# Patient Record
Sex: MALE | Race: BLACK OR AFRICAN AMERICAN | URBAN - METROPOLITAN AREA
[De-identification: names, ages, dates, MRNs, and addresses within clinical notes are randomized per-mention and may not be internally consistent; named-entity substitution may affect disease eponyms.]

---

## 2017-04-06 ENCOUNTER — TELEPHONE (OUTPATIENT)
Dept: FAMILY MEDICINE CLINIC | Age: 27
End: 2017-04-06

## 2017-04-06 NOTE — TELEPHONE ENCOUNTER
Pt has a cpe scheduled 4/17 and needs labs ordered before his appt. PLease review and order accordingly.

## 2017-04-10 ENCOUNTER — HOSPITAL ENCOUNTER (OUTPATIENT)
Dept: LAB | Age: 27
Discharge: HOME | End: 2017-04-10

## 2017-04-10 DIAGNOSIS — Z13.29 SCREENING FOR THYROID DISORDER: ICD-10-CM

## 2017-04-10 DIAGNOSIS — Z13.228 SCREENING FOR METABOLIC DISORDER: ICD-10-CM

## 2017-04-10 DIAGNOSIS — Z13.89 SCREENING FOR BLOOD OR PROTEIN IN URINE: ICD-10-CM

## 2017-04-10 DIAGNOSIS — Z13.0 SCREENING, ANEMIA, DEFICIENCY, IRON: ICD-10-CM

## 2017-04-10 DIAGNOSIS — Z13.1 SCREENING FOR DIABETES MELLITUS: ICD-10-CM

## 2017-04-10 DIAGNOSIS — Z13.220 SCREENING, LIPID: ICD-10-CM

## 2017-04-10 DIAGNOSIS — Z13.21 ENCOUNTER FOR VITAMIN DEFICIENCY SCREENING: ICD-10-CM

## 2017-04-10 DIAGNOSIS — Z13.0 SCREENING, ANEMIA, DEFICIENCY, IRON: Primary | ICD-10-CM

## 2017-04-10 PROCEDURE — 99001 SPECIMEN HANDLING PT-LAB: CPT | Performed by: FAMILY MEDICINE

## 2017-04-11 LAB
25(OH)D3+25(OH)D2 SERPL-MCNC: 15.3 NG/ML (ref 30–100)
ALBUMIN SERPL-MCNC: 4.3 G/DL (ref 3.5–5.5)
ALBUMIN/GLOB SERPL: 1.4 {RATIO} (ref 1.2–2.2)
ALP SERPL-CCNC: 108 IU/L (ref 39–117)
ALT SERPL-CCNC: 39 IU/L (ref 0–44)
APPEARANCE UR: CLEAR
AST SERPL-CCNC: 27 IU/L (ref 0–40)
BASOPHILS # BLD AUTO: 0 X10E3/UL (ref 0–0.2)
BASOPHILS NFR BLD AUTO: 0 %
BILIRUB SERPL-MCNC: <0.2 MG/DL (ref 0–1.2)
BILIRUB UR QL STRIP: NEGATIVE
BUN SERPL-MCNC: 16 MG/DL (ref 6–20)
BUN/CREAT SERPL: 17 (ref 9–20)
CALCIUM SERPL-MCNC: 9.2 MG/DL (ref 8.7–10.2)
CHLORIDE SERPL-SCNC: 103 MMOL/L (ref 96–106)
CHOLEST SERPL-MCNC: 170 MG/DL (ref 100–199)
CO2 SERPL-SCNC: 22 MMOL/L (ref 18–29)
COLOR UR: YELLOW
CREAT SERPL-MCNC: 0.92 MG/DL (ref 0.76–1.27)
EOSINOPHIL # BLD AUTO: 0.3 X10E3/UL (ref 0–0.4)
EOSINOPHIL NFR BLD AUTO: 5 %
ERYTHROCYTE [DISTWIDTH] IN BLOOD BY AUTOMATED COUNT: 13.9 % (ref 12.3–15.4)
EST. AVERAGE GLUCOSE BLD GHB EST-MCNC: 117 MG/DL
GLOBULIN SER CALC-MCNC: 3 G/DL (ref 1.5–4.5)
GLUCOSE SERPL-MCNC: 88 MG/DL (ref 65–99)
GLUCOSE UR QL: NEGATIVE
HBA1C MFR BLD: 5.7 % (ref 4.8–5.6)
HCT VFR BLD AUTO: 44.5 % (ref 37.5–51)
HDLC SERPL-MCNC: 33 MG/DL
HGB BLD-MCNC: 14.6 G/DL (ref 12.6–17.7)
HGB UR QL STRIP: NEGATIVE
IMM GRANULOCYTES # BLD: 0 X10E3/UL (ref 0–0.1)
IMM GRANULOCYTES NFR BLD: 0 %
INTERPRETATION, 910389: NORMAL
KETONES UR QL STRIP: NEGATIVE
LDLC SERPL CALC-MCNC: 88 MG/DL (ref 0–99)
LEUKOCYTE ESTERASE UR QL STRIP: NEGATIVE
LYMPHOCYTES # BLD AUTO: 2.9 X10E3/UL (ref 0.7–3.1)
LYMPHOCYTES NFR BLD AUTO: 39 %
MCH RBC QN AUTO: 27 PG (ref 26.6–33)
MCHC RBC AUTO-ENTMCNC: 32.8 G/DL (ref 31.5–35.7)
MCV RBC AUTO: 82 FL (ref 79–97)
MICRO URNS: NORMAL
MONOCYTES # BLD AUTO: 0.8 X10E3/UL (ref 0.1–0.9)
MONOCYTES NFR BLD AUTO: 10 %
NEUTROPHILS # BLD AUTO: 3.3 X10E3/UL (ref 1.4–7)
NEUTROPHILS NFR BLD AUTO: 46 %
NITRITE UR QL STRIP: NEGATIVE
PH UR STRIP: 6 [PH] (ref 5–7.5)
PLATELET # BLD AUTO: 219 X10E3/UL (ref 150–379)
POTASSIUM SERPL-SCNC: 4.6 MMOL/L (ref 3.5–5.2)
PROT SERPL-MCNC: 7.3 G/DL (ref 6–8.5)
PROT UR QL STRIP: NEGATIVE
RBC # BLD AUTO: 5.41 X10E6/UL (ref 4.14–5.8)
SODIUM SERPL-SCNC: 144 MMOL/L (ref 134–144)
SP GR UR: 1.03 (ref 1–1.03)
TRIGL SERPL-MCNC: 244 MG/DL (ref 0–149)
TSH SERPL DL<=0.005 MIU/L-ACNC: 1.66 UIU/ML (ref 0.45–4.5)
UROBILINOGEN UR STRIP-MCNC: 0.2 MG/DL (ref 0.2–1)
VLDLC SERPL CALC-MCNC: 49 MG/DL (ref 5–40)
WBC # BLD AUTO: 7.4 X10E3/UL (ref 3.4–10.8)

## 2017-04-17 ENCOUNTER — OFFICE VISIT (OUTPATIENT)
Dept: FAMILY MEDICINE CLINIC | Age: 27
End: 2017-04-17

## 2017-04-17 VITALS
WEIGHT: 236 LBS | SYSTOLIC BLOOD PRESSURE: 132 MMHG | DIASTOLIC BLOOD PRESSURE: 84 MMHG | RESPIRATION RATE: 16 BRPM | TEMPERATURE: 98.7 F | HEART RATE: 78 BPM | BODY MASS INDEX: 31.28 KG/M2 | OXYGEN SATURATION: 98 % | HEIGHT: 73 IN

## 2017-04-17 DIAGNOSIS — E66.9 OBESITY (BMI 30.0-34.9): ICD-10-CM

## 2017-04-17 DIAGNOSIS — E55.9 VITAMIN D DEFICIENCY: ICD-10-CM

## 2017-04-17 DIAGNOSIS — E78.1 HYPERTRIGLYCERIDEMIA: ICD-10-CM

## 2017-04-17 DIAGNOSIS — Z00.00 ROUTINE GENERAL MEDICAL EXAMINATION AT A HEALTH CARE FACILITY: Primary | ICD-10-CM

## 2017-04-17 DIAGNOSIS — R73.03 PREDIABETES: ICD-10-CM

## 2017-04-17 RX ORDER — ERGOCALCIFEROL 1.25 MG/1
50000 CAPSULE ORAL
Qty: 12 CAP | Refills: 3 | Status: SHIPPED | OUTPATIENT
Start: 2017-04-17 | End: 2018-04-13

## 2017-04-17 NOTE — PROGRESS NOTES
Jack Trevino is a 23 y.o. male here for physical       1. Have you been to the ER, urgent care clinic or hospitalized since your last visit? NO.     2. Have you seen or consulted any other health care providers outside of the [address] [place] [address] since your last visit (Include any pap smears or colon screening)? NO      Do you have an Advanced Directive? NO    Would you like information on Advanced Directives?  NO

## 2017-04-17 NOTE — PATIENT INSTRUCTIONS
Avoid dietary starch and sugar and follow a program of regular aerobic exercise. Begin ergocalciferol (Vitamin D2) 50,000 units weekly and OTC Vitamin D3 2,000 units daily. Return for follow up in 6 months, sooner with any problems. HgbA1c, Vitamin D level, fasting lipid panel at follow up       Well Visit, Ages 25 to 48: Care Instructions  Your Care Instructions  Physical exams can help you stay healthy. Your doctor has checked your overall health and may have suggested ways to take good care of yourself. He or she also may have recommended tests. At home, you can help prevent illness with healthy eating, regular exercise, and other steps. Follow-up care is a key part of your treatment and safety. Be sure to make and go to all appointments, and call your doctor if you are having problems. It's also a good idea to know your test results and keep a list of the medicines you take. How can you care for yourself at home? · Reach and stay at a healthy weight. This will lower your risk for many problems, such as obesity, diabetes, heart disease, and high blood pressure. · Get at least 30 minutes of physical activity on most days of the week. Walking is a good choice. You also may want to do other activities, such as running, swimming, cycling, or playing tennis or team sports. Discuss any changes in your exercise program with your doctor. · Do not smoke or allow others to smoke around you. If you need help quitting, talk to your doctor about stop-smoking programs and medicines. These can increase your chances of quitting for good. · Talk to your doctor about whether you have any risk factors for sexually transmitted infections (STIs). Having one sex partner (who does not have STIs and does not have sex with anyone else) is a good way to avoid these infections. · Use birth control if you do not want to have children at this time.  Talk with your doctor about the choices available and what might be best for you.  · Protect your skin from too much sun. When you're outdoors from 10 a.m. to 4 p.m., stay in the shade or cover up with clothing and a hat with a wide brim. Wear sunglasses that block UV rays. Even when it's cloudy, put broad-spectrum sunscreen (SPF 30 or higher) on any exposed skin. · See a dentist one or two times a year for checkups and to have your teeth cleaned. · Wear a seat belt in the car. · Drink alcohol in moderation, if at all. That means no more than 2 drinks a day for men and 1 drink a day for women. Follow your doctor's advice about when to have certain tests. These tests can spot problems early. For everyone  · Cholesterol. Have the fat (cholesterol) in your blood tested after age 21. Your doctor will tell you how often to have this done based on your age, family history, or other things that can increase your risk for heart disease. · Blood pressure. Have your blood pressure checked during a routine doctor visit. Your doctor will tell you how often to check your blood pressure based on your age, your blood pressure results, and other factors. · Vision. Talk with your doctor about how often to have a glaucoma test.  · Diabetes. Ask your doctor whether you should have tests for diabetes. · Colon cancer. Have a test for colon cancer at age 48. You may have one of several tests. If you are younger than 48, you may need a test earlier if you have any risk factors. Risk factors include whether you already had a precancerous polyp removed from your colon or whether your parent, brother, sister, or child has had colon cancer. For women  · Breast exam and mammogram. Talk to your doctor about when you should have a clinical breast exam and a mammogram. Medical experts differ on whether and how often women under 50 should have these tests. Your doctor can help you decide what is right for you. · Pap test and pelvic exam. Begin Pap tests at age 24.  A Pap test is the best way to find cervical cancer. The test often is part of a pelvic exam. Ask how often to have this test.  · Tests for sexually transmitted infections (STIs). Ask whether you should have tests for STIs. You may be at risk if you have sex with more than one person, especially if your partners do not wear condoms. For men  · Tests for sexually transmitted infections (STIs). Ask whether you should have tests for STIs. You may be at risk if you have sex with more than one person, especially if you do not wear a condom. · Testicular cancer exam. Ask your doctor whether you should check your testicles regularly. · Prostate exam. Talk to your doctor about whether you should have a blood test (called a PSA test) for prostate cancer. Experts differ on whether and when men should have this test. Some experts suggest it if you are older than 39 and are -American or have a father or brother who got prostate cancer when he was younger than 72. When should you call for help? Watch closely for changes in your health, and be sure to contact your doctor if you have any problems or symptoms that concern you. Where can you learn more? Go to http://raj-yan.info/. Enter P072 in the search box to learn more about \"Well Visit, Ages 25 to 48: Care Instructions. \"  Current as of: July 19, 2016  Content Version: 11.2  © 1732-4872 [employer]. Care instructions adapted under license by [employer] (which disclaims liability or warranty for this information). If you have questions about a medical condition or this instruction, always ask your healthcare professional. [place] [address] any warranty or liability for your use of this information. Prediabetes: Care Instructions  Your Care Instructions  Prediabetes is a warning sign that you are at risk for getting type 2 diabetes. It means that your blood sugar is higher than it should be.  The food you eat turns into sugar, which your body uses for energy. Normally, an organ called the pancreas makes insulin, which allows the sugar in your blood to get into your body's cells. But when your body can't use insulin the right way, the sugar doesn't move into cells. It stays in your blood instead. This is called insulin resistance. The buildup of sugar in the blood causes prediabetes. The good news is that lifestyle changes may help you get your blood sugar back to normal and help you avoid or delay diabetes. Follow-up care is a key part of your treatment and safety. Be sure to make and go to all appointments, and call your doctor if you are having problems. It's also a good idea to know your test results and keep a list of the medicines you take. How can you care for yourself at home? · Watch your weight. A healthy weight helps your body use insulin properly. · Limit the amount of calories, sweets, and unhealthy fat you eat. Ask your doctor if you should see a dietitian. A registered dietitian can help you create meal plans that fit your lifestyle. · Get at least 30 minutes of exercise on most days of the week. Exercise helps control your blood sugar. It also helps you maintain a healthy weight. Walking is a good choice. You also may want to do other activities, such as running, swimming, cycling, or playing tennis or team sports. · Do not smoke. Smoking can make prediabetes worse. If you need help quitting, talk to your doctor about stop-smoking programs and medicines. These can increase your chances of quitting for good. · If your doctor prescribed medicines, take them exactly as prescribed. Call your doctor if you think you are having a problem with your medicine. You will get more details on the specific medicines your doctor prescribes. When should you call for help? Watch closely for changes in your health, and be sure to contact your doctor if:  · You have any symptoms of diabetes.  These may include:  ¨ Being thirsty more often.  Dellia Dec more. ¨ Being hungrier. ¨ Losing weight. ¨ Being very tired. ¨ Having blurry vision. · You have a wound that will not heal.  · You have an infection that will not go away. · You have problems with your blood pressure. · You want more information about diabetes and how you can keep from getting it. Where can you learn more? Go to http://raj-yan.info/. Enter I222 in the search box to learn more about \"Prediabetes: Care Instructions. \"  Current as of: May 23, 2016  Content Version: 11.2  © 8479-4783 [employer]. Care instructions adapted under license by [employer] (which disclaims liability or warranty for this information). If you have questions about a medical condition or this instruction, always ask your healthcare professional. [address] any warranty or liability for your use of this information. Learning About Obesity  What is obesity? Obesity means having so much body fat that your health is in danger. Having too much body fat can lead to type 2 diabetes, heart disease, high blood pressure, arthritis, sleep apnea, and stroke. Even if you don't feel bad now, think about these health risks. Do they seem like a good reason to start on a new path toward a healthier weight? Or do you have another personal, powerful reason for wanting to lose weight? Whatever it is, keep it in mind. It can be hard to change eating habits and exercise habits. But with your own reason and plan, you can do it. How do you know if your weight is in the obesity range? To know if your weight is in the obesity range, your doctor looks at your body mass index (BMI) and waist size. Your BMI is a number that is calculated from your weight and your height.  To figure your BMI for yourself, get a BMI table from your doctor or use an online tool, such as http://www.abdul.com/ on the [place] [employer]. What causes obesity? When you take in more calories than you burn off, you gain weight. How you eat, how active you are, and other things affect how your body uses calories and whether you gain weight. If you have family members who have too much body fat, you may have inherited a tendency to gain weight. And your family also helps form your eating and lifestyle habits, which can lead to obesity. Also, our busy lives make it harder to plan and cook healthy meals. For many of us, it's easier to reach for prepared foods, go out to eat, or go to the drive-through. But these foods are often high in saturated fat and calories. Portions are often too large. What can you do to reach a healthy weight? Focus on health, not diets. Diets are hard to stay on and don't work in the long run. It is very hard to stay with a diet that includes lots of big changes in your eating habits. Instead of a diet, focus on lifestyle changes that will improve your health and achieve the right balance of energy and calories. To lose weight, you need to burn more calories than you take in. You can do it by eating healthy foods in reasonable amounts and becoming more active, even a little bit every day. Making small changes over time can add up to a lot. Make a plan for change. Many people have found that naming their reasons for change and staying focused on their plan can make a big difference. Work with your doctor to create a plan that is right for you. · Ask yourself: [place] are my personal, most powerful reasons for wanting this change? What will my life look like when I've made the change? \"  · Set your long-term goal. Make it specific, such as \"I will lose x pounds. \"  · Break your long-term goal into smaller, short-term goals. Make these small steps specific and within your reach, things you know you can do. These steps are what keep you going from day to day.   How can you stay on your plan for change? Be ready. Choose to start during a time when there are few events that might trigger slip-ups, like holidays, social events, and high-stress periods. Decide on your first few steps. Most people have more success when they make small changes, one step at a time. For example, you might switch a daily candy bar to a piece of fruit, walk 10 minutes more, or add more vegetables to a meal.  Line up your support people. Make sure you're not going to be alone as you make this change. Connect with people who understand how important it is to you. Ask family members and friends for help in keeping with your plan. And think about who could make it harder for you, and how to handle them. Try tracking. People who keep track of what they eat, feel, and do are better at losing weight. Try writing down things like:  · What and how much you eat. · How you feel before and after each meal.  · Details about each meal (like eating out or at home, eating alone, or with friends or family). · What you do to be active. Look and plan. As you track, look for patterns that you may want to change. Take note of:  · When you eat and whether you skip meals. · How often you eat out. · How many fruits and vegetables you eat. · When you eat beyond feeling full. · When and why you eat for reasons other than being hungry. When you stray from your plan, don't get upset. Figure out what made you slip up and how you can fix it. Can you take medicines or have surgery to lose weight? Before your doctor will prescribe medicines or surgery, he or she will probably want you to be more active and follow your healthy eating plan for a period of time. These habits are key lifelong changes for managing your weight, with or without other medical treatment. And these changes can help you avoid weight-related health problems. Follow-up care is a key part of your treatment and safety.  Be sure to make and go to all appointments, and call your doctor if you are having problems. It's also a good idea to know your test results and keep a list of the medicines you take. Where can you learn more? Go to http://raj-yan.info/. Enter N111 in the search box to learn more about \"Learning About Obesity. \"  Current as of: October 13, 2016  Content Version: 11.2  © 4058-3788 [employer]. Care instructions adapted under license by [employer] (which disclaims liability or warranty for this information). If you have questions about a medical condition or this instruction, always ask your healthcare professional. [address] any warranty or liability for your use of this information. Starting a Weight Loss Plan: Care Instructions  Your Care Instructions  If you are thinking about losing weight, it can be hard to know where to start. Your doctor can help you set up a weight loss plan that best meets your needs. You may want to take a class on nutrition or exercise, or join a weight loss support group. If you have questions about how to make changes to your eating or exercise habits, ask your doctor about seeing a registered dietitian or an exercise specialist.  It can be a big challenge to lose weight. But you do not have to make huge changes at once. Make small changes, and stick with them. When those changes become habit, add a few more changes. If you do not think you are ready to make changes right now, try to pick a date in the future. Make an appointment to see your doctor to discuss whether the time is right for you to start a plan. Follow-up care is a key part of your treatment and safety. Be sure to make and go to all appointments, and call your doctor if you are having problems. Its also a good idea to know your test results and keep a list of the medicines you take. How can you care for yourself at home? · Set realistic goals.  Many people expect to lose much more weight than is likely. A weight loss of 5% to 10% of your body weight may be enough to improve your health. · Get family and friends involved to provide support. Talk to them about why you are trying to lose weight, and ask them to help. They can help by participating in exercise and having meals with you, even if they may be eating something different. · Find what works best for you. If you do not have time or do not like to cook, a program that offers meal replacement bars or shakes may be better for you. Or if you like to prepare meals, finding a plan that includes daily menus and recipes may be best.  · Ask your doctor about other health professionals who can help you achieve your weight loss goals. ¨ A dietitian can help you make healthy changes in your diet. ¨ An exercise specialist or  can help you develop a safe and effective exercise program.  ¨ A counselor or psychiatrist can help you cope with issues such as depression, anxiety, or family problems that can make it hard to focus on weight loss. · Consider joining a support group for people who are trying to lose weight. Your doctor can suggest groups in your area. Where can you learn more? Go to http://raj-yan.info/. Enter R943 in the search box to learn more about \"Starting a Weight Loss Plan: Care Instructions. \"  Current as of: October 13, 2016  Content Version: 11.2  © 6986-7519 [employer]. Care instructions adapted under license by [employer] (which disclaims liability or warranty for this information). If you have questions about a medical condition or this instruction, always ask your healthcare professional. [place] [address] any warranty or liability for your use of this information.

## 2017-04-17 NOTE — MR AVS SNAPSHOT
Visit Information Date & Time Provider Department Dept. Phone Encounter #  
 4/17/2017  1:00 [phone] [id number] Upcoming Health Maintenance Date Due INFLUENZA AGE 9 TO ADULT 8/1/2016 DTaP/Tdap/Td series (7 - Td) 7/24/2019 Allergies as of 4/17/2017  Review Complete On: 4/17/2017 By: Tank Beltran MD  
 No Known Allergies Current Immunizations  Never Reviewed Name Date DTAP Vaccine 1/19/1996, 2/16/1993, 1/22/1992, 1991, 1991 HIB Vaccine 10/14/1992, 1/22/1992, 1991, 1991 Hepatitis B Vaccine 3/5/2002, 9/27/2001, 8/22/2001 IPV 1/19/1996, 2/16/1993, 1991, 1991 MMR Vaccine 1/19/1996, 10/14/1992 Meningococcal Vaccine 8/17/2007 PPD 7/24/2009 TDAP Vaccine 7/24/2009 Varicella Virus Vaccine Live 6/30/1995 Not reviewed this visit You Were Diagnosed With   
  
 Codes Comments Routine general medical examination at a health care facility    -  Primary ICD-10-CM: Z00.00 ICD-9-CM: V70.0 Hypertriglyceridemia     ICD-10-CM: E78.1 ICD-9-CM: 272.1 Prediabetes     ICD-10-CM: R73.03 
ICD-9-CM: 790.29 Vitamin D deficiency     ICD-10-CM: E55.9 ICD-9-CM: 268.9 Obesity (BMI 30.0-34.9)     ICD-10-CM: P26.6 ICD-9-CM: 278.00 Vitals BP Pulse Temp Resp Height(growth percentile) Weight(growth percentile) 132/84 (BP 1 Location: Left arm, BP Patient Position: Sitting) 78 98.7 °F (37.1 °C) (Oral) 16 6' 1\" (1.854 m) 236 lb (107 kg) SpO2 BMI Smoking Status 98% 31.14 kg/m2 Never Smoker Vitals History BMI and BSA Data Body Mass Index Body Surface Area  
 31.14 kg/m 2 2.35 m 2 Preferred Pharmacy Pharmacy Name Phone [address] [facility] [phone] Your Updated Medication List  
  
   
This list is accurate as of: 4/17/17  1:37 PM.  Always use your most recent med list.  
  
  
  
  
 clotrimazole-betamethasone 1-0.05 % lotion Commonly known as:  Darra Bessy Apply  to affected area two (2) times a day. coal tar 5 % lotion Commonly known as:  OXIPOR VHC Apply  to affected area two (2) times a day. ergocalciferol 50,000 unit capsule Commonly known as:  ERGOCALCIFEROL Take 1 Cap by mouth every seven (7) days. salicylic acid 3 % Sham  
Commonly known as:  [address] Use to scalp daily Prescriptions Sent to Pharmacy Refills  
 ergocalciferol (ERGOCALCIFEROL) 50,000 unit capsule 3 Sig: Take 1 Cap by mouth every seven (7) days. Class: Normal  
 Pharmacy: [address] [facility] #: [phone] Route: Oral  
  
Patient Instructions Avoid dietary starch and sugar and follow a program of regular aerobic exercise. Begin ergocalciferol (Vitamin D2) 50,000 units weekly and OTC Vitamin D3 2,000 units daily. Return for follow up in 6 months, sooner with any problems. HgbA1c, Vitamin D level, fasting lipid panel at follow up Well Visit, Ages 25 to 48: Care Instructions Your Care Instructions Physical exams can help you stay healthy. Your doctor has checked your overall health and may have suggested ways to take good care of yourself. He or she also may have recommended tests. At home, you can help prevent illness with healthy eating, regular exercise, and other steps. Follow-up care is a key part of your treatment and safety. Be sure to make and go to all appointments, and call your doctor if you are having problems. It's also a good idea to know your test results and keep a list of the medicines you take. How can you care for yourself at home? · Reach and stay at a healthy weight. This will lower your risk for many problems, such as obesity, diabetes, heart disease, and high blood pressure. · Get at least 30 minutes of physical activity on most days of the week. Walking is a good choice. You also may want to do other activities, such as running, swimming, cycling, or playing tennis or team sports. Discuss any changes in your exercise program with your doctor. · Do not smoke or allow others to smoke around you. If you need help quitting, talk to your doctor about stop-smoking programs and medicines. These can increase your chances of quitting for good. · Talk to your doctor about whether you have any risk factors for sexually transmitted infections (STIs). Having one sex partner (who does not have STIs and does not have sex with anyone else) is a good way to avoid these infections. · Use birth control if you do not want to have children at this time. Talk with your doctor about the choices available and what might be best for you. · Protect your skin from too much sun. When you're outdoors from 10 a.m. to 4 p.m., stay in the shade or cover up with clothing and a hat with a wide brim. Wear sunglasses that block UV rays. Even when it's cloudy, put broad-spectrum sunscreen (SPF 30 or higher) on any exposed skin. · See a dentist one or two times a year for checkups and to have your teeth cleaned. · Wear a seat belt in the car. · Drink alcohol in moderation, if at all. That means no more than 2 drinks a day for men and 1 drink a day for women. Follow your doctor's advice about when to have certain tests. These tests can spot problems early. For everyone · Cholesterol. Have the fat (cholesterol) in your blood tested after age 21. Your doctor will tell you how often to have this done based on your age, family history, or other things that can increase your risk for heart disease. · Blood pressure. Have your blood pressure checked during a routine doctor visit. Your doctor will tell you how often to check your blood pressure based on your age, your blood pressure results, and other factors. · Vision. Talk with your doctor about how often to have a glaucoma test. 
· Diabetes. Ask your doctor whether you should have tests for diabetes. · Colon cancer. Have a test for colon cancer at age 48. You may have one of several tests. If you are younger than 48, you may need a test earlier if you have any risk factors. Risk factors include whether you already had a precancerous polyp removed from your colon or whether your parent, brother, sister, or child has had colon cancer. For women · Breast exam and mammogram. Talk to your doctor about when you should have a clinical breast exam and a mammogram. Medical experts differ on whether and how often women under 50 should have these tests. Your doctor can help you decide what is right for you. · Pap test and pelvic exam. Begin Pap tests at age 24. A Pap test is the best way to find cervical cancer. The test often is part of a pelvic exam. Ask how often to have this test. 
· Tests for sexually transmitted infections (STIs). Ask whether you should have tests for STIs. You may be at risk if you have sex with more than one person, especially if your partners do not wear condoms. For men · Tests for sexually transmitted infections (STIs). Ask whether you should have tests for STIs. You may be at risk if you have sex with more than one person, especially if you do not wear a condom. · Testicular cancer exam. Ask your doctor whether you should check your testicles regularly. · Prostate exam. Talk to your doctor about whether you should have a blood test (called a PSA test) for prostate cancer. Experts differ on whether and when men should have this test. Some experts suggest it if you are older than 39 and are -American or have a father or brother who got prostate cancer when he was younger than 72. When should you call for help?  
Watch closely for changes in your health, and be sure to contact your doctor if you have any problems or symptoms that concern you. Where can you learn more? Go to http://raj-yan.info/. Enter P072 in the search box to learn more about \"Well Visit, Ages 25 to 48: Care Instructions. \" Current as of: July 19, 2016 Content Version: 11.2 © 1258-1639 Shopular. Care instructions adapted under license by [employer] (which disclaims liability or warranty for this information). If you have questions about a medical condition or this instruction, always ask your healthcare professional. [place] [address] any warranty or liability for your use of this information. Prediabetes: Care Instructions Your Care Instructions Prediabetes is a warning sign that you are at risk for getting type 2 diabetes. It means that your blood sugar is higher than it should be. The food you eat turns into sugar, which your body uses for energy. Normally, an organ called the pancreas makes insulin, which allows the sugar in your blood to get into your body's cells. But when your body can't use insulin the right way, the sugar doesn't move into cells. It stays in your blood instead. This is called insulin resistance. The buildup of sugar in the blood causes prediabetes. The good news is that lifestyle changes may help you get your blood sugar back to normal and help you avoid or delay diabetes. Follow-up care is a key part of your treatment and safety. Be sure to make and go to all appointments, and call your doctor if you are having problems. It's also a good idea to know your test results and keep a list of the medicines you take. How can you care for yourself at home? · Watch your weight. A healthy weight helps your body use insulin properly. · Limit the amount of calories, sweets, and unhealthy fat you eat. Ask your doctor if you should see a dietitian. A registered dietitian can help you create meal plans that fit your lifestyle. · Get at least 30 minutes of exercise on most days of the week. Exercise helps control your blood sugar. It also helps you maintain a healthy weight. Walking is a good choice. You also may want to do other activities, such as running, swimming, cycling, or playing tennis or team sports. · Do not smoke. Smoking can make prediabetes worse. If you need help quitting, talk to your doctor about stop-smoking programs and medicines. These can increase your chances of quitting for good. · If your doctor prescribed medicines, take them exactly as prescribed. Call your doctor if you think you are having a problem with your medicine. You will get more details on the specific medicines your doctor prescribes. When should you call for help? Watch closely for changes in your health, and be sure to contact your doctor if: 
· You have any symptoms of diabetes. These may include: ¨ Being thirsty more often. ¨ Urinating more. ¨ Being hungrier. ¨ Losing weight. ¨ Being very tired. ¨ Having blurry vision. · You have a wound that will not heal. 
· You have an infection that will not go away. · You have problems with your blood pressure. · You want more information about diabetes and how you can keep from getting it. Where can you learn more? Go to http://raj-yan.info/. Enter I222 in the search box to learn more about \"Prediabetes: Care Instructions. \" Current as of: May 23, 2016 Content Version: 11.2 © 7293-9540 StoreDot. Care instructions adapted under license by [employer] (which disclaims liability or warranty for this information). If you have questions about a medical condition or this instruction, always ask your healthcare professional. [address] any warranty or liability for your use of this information. Learning About Obesity What is obesity? Obesity means having so much body fat that your health is in danger. Having too much body fat can lead to type 2 diabetes, heart disease, high blood pressure, arthritis, sleep apnea, and stroke. Even if you don't feel bad now, think about these health risks. Do they seem like a good reason to start on a new path toward a healthier weight? Or do you have another personal, powerful reason for wanting to lose weight? Whatever it is, keep it in mind. It can be hard to change eating habits and exercise habits. But with your own reason and plan, you can do it. How do you know if your weight is in the obesity range? To know if your weight is in the obesity range, your doctor looks at your body mass index (BMI) and waist size. Your BMI is a number that is calculated from your weight and your height. To figure your BMI for yourself, get a BMI table from your doctor or use an online tool, such as http://www.abdul.com/ on the ToyWaste2Tricityus of Stylistpick. What causes obesity? When you take in more calories than you burn off, you gain weight. How you eat, how active you are, and other things affect how your body uses calories and whether you gain weight. If you have family members who have too much body fat, you may have inherited a tendency to gain weight. And your family also helps form your eating and lifestyle habits, which can lead to obesity. Also, our busy lives make it harder to plan and cook healthy meals. For many of us, it's easier to reach for prepared foods, go out to eat, or go to the drive-through. But these foods are often high in saturated fat and calories. Portions are often too large. What can you do to reach a healthy weight? Focus on health, not diets. Diets are hard to stay on and don't work in the long run. It is very hard to stay with a diet that includes lots of big changes in your eating habits.  
Instead of a diet, focus on lifestyle changes that will improve your health and achieve the right balance of energy and calories. To lose weight, you need to burn more calories than you take in. You can do it by eating healthy foods in reasonable amounts and becoming more active, even a little bit every day. Making small changes over time can add up to a lot. Make a plan for change. Many people have found that naming their reasons for change and staying focused on their plan can make a big difference. Work with your doctor to create a plan that is right for you. · Ask yourself: [place] are my personal, most powerful reasons for wanting this change? What will my life look like when I've made the change? \" · Set your long-term goal. Make it specific, such as \"I will lose x pounds. \" 
· Break your long-term goal into smaller, short-term goals. Make these small steps specific and within your reach, things you know you can do. These steps are what keep you going from day to day. How can you stay on your plan for change? Be ready. Choose to start during a time when there are few events that might trigger slip-ups, like holidays, social events, and high-stress periods. Decide on your first few steps. Most people have more success when they make small changes, one step at a time. For example, you might switch a daily candy bar to a piece of fruit, walk 10 minutes more, or add more vegetables to a meal. 
Line up your support people. Make sure you're not going to be alone as you make this change. Connect with people who understand how important it is to you. Ask family members and friends for help in keeping with your plan. And think about who could make it harder for you, and how to handle them. Try tracking. People who keep track of what they eat, feel, and do are better at losing weight. Try writing down things like: · What and how much you eat. · How you feel before and after each meal. 
· Details about each meal (like eating out or at home, eating alone, or with friends or family). · What you do to be active. Look and plan. As you track, look for patterns that you may want to change. Take note of: · When you eat and whether you skip meals. · How often you eat out. · How many fruits and vegetables you eat. · When you eat beyond feeling full. · When and why you eat for reasons other than being hungry. When you stray from your plan, don't get upset. Figure out what made you slip up and how you can fix it. Can you take medicines or have surgery to lose weight? Before your doctor will prescribe medicines or surgery, he or she will probably want you to be more active and follow your healthy eating plan for a period of time. These habits are key lifelong changes for managing your weight, with or without other medical treatment. And these changes can help you avoid weight-related health problems. Follow-up care is a key part of your treatment and safety. Be sure to make and go to all appointments, and call your doctor if you are having problems. It's also a good idea to know your test results and keep a list of the medicines you take. Where can you learn more? Go to http://raj-yan.info/. Enter N111 in the search box to learn more about \"Learning About Obesity. \" Current as of: October 13, 2016 Content Version: 11.2 © 5985-0436 MindOps, Incorporated. Care instructions adapted under license by StoreAge (which disclaims liability or warranty for this information). If you have questions about a medical condition or this instruction, always ask your healthcare professional. [address] any warranty or liability for your use of this information. Starting a Weight Loss Plan: Care Instructions Your Care Instructions If you are thinking about losing weight, it can be hard to know where to start.  Your doctor can help you set up a weight loss plan that best meets your needs. You may want to take a class on nutrition or exercise, or join a weight loss support group. If you have questions about how to make changes to your eating or exercise habits, ask your doctor about seeing a registered dietitian or an exercise specialist. 
It can be a big challenge to lose weight. But you do not have to make huge changes at once. Make small changes, and stick with them. When those changes become habit, add a few more changes. If you do not think you are ready to make changes right now, try to pick a date in the future. Make an appointment to see your doctor to discuss whether the time is right for you to start a plan. Follow-up care is a key part of your treatment and safety. Be sure to make and go to all appointments, and call your doctor if you are having problems. Its also a good idea to know your test results and keep a list of the medicines you take. How can you care for yourself at home? · Set realistic goals. Many people expect to lose much more weight than is likely. A weight loss of 5% to 10% of your body weight may be enough to improve your health. · Get family and friends involved to provide support. Talk to them about why you are trying to lose weight, and ask them to help. They can help by participating in exercise and having meals with you, even if they may be eating something different. · Find what works best for you. If you do not have time or do not like to cook, a program that offers meal replacement bars or shakes may be better for you. Or if you like to prepare meals, finding a plan that includes daily menus and recipes may be best. 
· Ask your doctor about other health professionals who can help you achieve your weight loss goals. ¨ A dietitian can help you make healthy changes in your diet.  
¨ An exercise specialist or  can help you develop a safe and effective exercise program. 
 ¨ A counselor or psychiatrist can help you cope with issues such as depression, anxiety, or family problems that can make it hard to focus on weight loss. · Consider joining a support group for people who are trying to lose weight. Your doctor can suggest groups in your area. Where can you learn more? Go to http://raj-yan.info/. Enter X509 in the search box to learn more about \"Starting a Weight Loss Plan: Care Instructions. \" Current as of: October 13, 2016 Content Version: 11.2 © 3428-1317 InfoGin. Care instructions adapted under license by MobileSnack (which disclaims liability or warranty for this information). If you have questions about a medical condition or this instruction, always ask your healthcare professional. [address] any warranty or liability for your use of this information. Introducing [facility]! Savanna Smallwood introduces Freedcamp patient portal. Now you can access parts of your medical record, email your doctor's office, and request medication refills online. 1. In your internet browser, go to [email or web address] 2. Click on the First Time User? Click Here link in the Sign In box. You will see the New Member Sign Up page. 3. Enter your Freedcamp Access Code exactly as it appears below. You will not need to use this code after youve completed the sign-up process. If you do not sign up before the expiration date, you must request a new code. · Freedcamp Access Code: [email or web address] Expires: 7/16/2017  1:11 PM 
 
4. Enter the last four digits of your Social Security Number (xxxx) and Date of Birth (mm/dd/yyyy) as indicated and click Submit. You will be taken to the next sign-up page. 5. Create a Freedcamp ID. This will be your Freedcamp login ID and cannot be changed, so think of one that is secure and easy to remember. 6. Create a Ace Metrix password. You can change your password at any time. 7. Enter your Password Reset Question and Answer. This can be used at a later time if you forget your password. 8. Enter your e-mail address. You will receive e-mail notification when new information is available in [address] 9. Click Sign Up. You can now view and download portions of your medical record. 10. Click the Download Summary menu link to download a portable copy of your medical information. If you have questions, please visit the Frequently Asked Questions section of the Ace Metrix website. Remember, Ace Metrix is NOT to be used for urgent needs. For medical emergencies, dial 911. Now available from your iPhone and Android! Please provide this summary of care documentation to your next provider. Your primary care clinician is listed as Reginald Saldana. If you have any questions after today's visit, please call [phone].

## 2017-04-17 NOTE — PROGRESS NOTES
HISTORY OF PRESENT ILLNESS  Ang Devi is a 23 y.o. male. Complete Physical   The history is provided by the patient and medical records. Pertinent negatives include no chest pain, no abdominal pain, no headaches and no shortness of breath. Past Medical History:   Diagnosis Date    Asthma     childhood    Psoriasis     Seborrhea      No past surgical history on file. Family History   Problem Relation Age of Onset    Diabetes Father     Cancer Father      bone marrow cancer    Hypertension Mother      History   Smoking Status    Never Smoker   Smokeless Tobacco    Not on file     History   Alcohol Use No     Health Maintenance Review:  Immunization History   Administered Date(s) Administered    DTAP Vaccine 1991, 1991, 01/22/1992, 02/16/1993, 01/19/1996    HIB Vaccine 1991, 1991, 01/22/1992, 10/14/1992    Hepatitis B Vaccine 08/22/2001, 09/27/2001, 03/05/2002    IPV 1991, 1991, 02/16/1993, 01/19/1996    MMR Vaccine 10/14/1992, 01/19/1996    Meningococcal Vaccine 08/17/2007    PPD 07/24/2009    TDAP Vaccine 07/24/2009    Varicella Virus Vaccine Live 06/30/1995       Annual CPE/Wellness Encounter Plan:  Anticipatory guidance and recommendations provided verbally and with printed information. Return for routine follow up in 1 year, sooner with any problems. Problems Encounter:  HPI - See PMH, ROS  Mr. Dinah Pérez evaluation today included review of his lab results which show hypertriglyceridemia and Vitamin D insufficiency. His physical exam reveals a BMI and body habitus consistent with obesity. As indicated in the plan, he was instructed in lifestyle modifications and the addition of OTC Vitamin D3 to treat these conditions as well as plans for follow up.     Current Outpatient Prescriptions:     coal tar 5 % lotion, Apply  to affected area two (2) times a day., Disp: 210 mL, Rfl: 3    salicylic acid ([employer]) 3 % sham, Use to scalp daily, Disp: 480 mL, Rfl: 5    clotrimazole-betamethasone (LOTRISONE) 1-0.05 % lotion, Apply  to affected area two (2) times a day., Disp: 120 mL, Rfl: 5      Review of Systems   Constitutional: Negative for chills, fever and weight loss. HENT: Negative for hearing loss. Eyes: Negative for blurred vision and double vision. Respiratory: Negative for cough, shortness of breath and wheezing. Cardiovascular: Negative for chest pain, palpitations and leg swelling. Gastrointestinal: Negative for abdominal pain, constipation, diarrhea, heartburn, nausea and vomiting. Genitourinary: Negative for dysuria and urgency. Musculoskeletal: Negative for joint pain and myalgias. Skin: Negative for itching and rash. Neurological: Negative for dizziness, tingling, sensory change, focal weakness and headaches. Endo/Heme/Allergies: Positive for environmental allergies (seasonal). Psychiatric/Behavioral: Negative for depression. The patient is not nervous/anxious and does not have insomnia. Visit Vitals    /84 (BP 1 Location: Left arm, BP Patient Position: Sitting)    Pulse 78    Temp 98.7 °F (37.1 °C) (Oral)    Resp 16    Ht 6' 1\" (1.854 m)    Wt 236 lb (107 kg)    SpO2 98%    BMI 31.14 kg/m2       Physical Exam   Constitutional: He is oriented to person, place, and time. He appears well-developed and well-nourished. HENT:   Head: Normocephalic. Right Ear: Tympanic membrane and ear canal normal.   Left Ear: Tympanic membrane and ear canal normal.   Mouth/Throat: Oropharynx is clear and moist.   Eyes: Conjunctivae and EOM are normal. Pupils are equal, round, and reactive to light. Neck: Neck supple. Cardiovascular: Normal rate, regular rhythm, normal heart sounds and intact distal pulses. Pulmonary/Chest: Effort normal and breath sounds normal.   Abdominal: Soft. Bowel sounds are normal. There is no tenderness. Musculoskeletal: He exhibits no edema.    Neurological: He is alert and oriented to person, place, and time. He has normal reflexes. Rhomberg negative, no focal neurological deficits noted     Skin: Skin is warm and dry. Psychiatric: He has a normal mood and affect. His behavior is normal.   Nursing note and vitals reviewed. Results for Krystle Hidalgo (MRN [id number]) as of 4/17/2017 13:07   Ref. Range 4/10/2017 08:39   WBC Latest Ref Range: 3.4 - 10.8 x10E3/uL 7.4   RBC Latest Ref Range: 4.14 - 5.80 x10E6/uL 5.41   HGB Latest Ref Range: 12.6 - 17.7 g/dL 14.6   HCT Latest Ref Range: 37.5 - 51.0 % 44.5   MCV Latest Ref Range: 79 - 97 fL 82   MCH Latest Ref Range: 26.6 - 33.0 pg 27.0   MCHC Latest Ref Range: 31.5 - 35.7 g/dL 32.8   RDW Latest Ref Range: 12.3 - 15.4 % 13.9   PLATELET Latest Ref Range: 150 - 379 x10E3/uL 219   NEUTROPHILS Latest Units: % 46   Lymphocytes Latest Units: % 39   MONOCYTES Latest Units: % 10   EOSINOPHILS Latest Units: % 5   BASOPHILS Latest Units: % 0   IMMATURE GRANULOCYTES Latest Units: % 0   ABS. NEUTROPHILS Latest Ref Range: 1.4 - 7.0 x10E3/uL 3.3   ABS. IMM. GRANS. Latest Ref Range: 0.0 - 0.1 x10E3/uL 0.0   Abs Lymphocytes Latest Ref Range: 0.7 - 3.1 x10E3/uL 2.9   ABS. MONOCYTES Latest Ref Range: 0.1 - 0.9 x10E3/uL 0.8   ABS. EOSINOPHILS Latest Ref Range: 0.0 - 0.4 x10E3/uL 0.3   ABS.  BASOPHILS Latest Ref Range: 0.0 - 0.2 x10E3/uL 0.0   Color Latest Ref Range: Yellow  Yellow   Appearance Latest Ref Range: Clear  Clear   Specific Gravity Latest Ref Range: 1.005 - 1.030  1.027   pH (UA) Latest Ref Range: 5.0 - 7.5  6.0   Protein Latest Ref Range: Negative/Trace  Negative   Glucose Latest Ref Range: Negative  Negative   Ketone Latest Ref Range: Negative  Negative   Blood Latest Ref Range: Negative  Negative   Bilirubin Latest Ref Range: Negative  Negative   Urobilinogen Latest Ref Range: 0.2 - 1.0 mg/dL 0.2   Nitrites Latest Ref Range: Negative  Negative   Leukocyte Esterase Latest Ref Range: Negative  Negative   Microscopic Examination Unknown Comment   Sodium Latest Ref Range: 134 - 144 mmol/L 144   Potassium Latest Ref Range: 3.5 - 5.2 mmol/L 4.6   Chloride Latest Ref Range: 96 - 106 mmol/L 103   CO2 Latest Ref Range: 18 - 29 mmol/L 22   Glucose Latest Ref Range: 65 - 99 mg/dL 88   BUN Latest Ref Range: 6 - 20 mg/dL 16   Creatinine Latest Ref Range: 0.76 - 1.27 mg/dL 0.92   BUN/Creatinine ratio Latest Ref Range: 9 - 20  17   Calcium Latest Ref Range: 8.7 - 10.2 mg/dL 9.2   GFR est non-AA Latest Ref Range: >59 mL/min/1.73 115   GFR est AA Latest Ref Range: >59 mL/min/1.73 133   Bilirubin, total Latest Ref Range: 0.0 - 1.2 mg/dL <0.2   Protein, total Latest Ref Range: 6.0 - 8.5 g/dL 7.3   Albumin Latest Ref Range: 3.5 - 5.5 g/dL 4.3   A-G Ratio Latest Ref Range: 1.2 - 2.2  1.4   ALT Latest Ref Range: 0 - 44 IU/L 39   AST Latest Ref Range: 0 - 40 IU/L 27   Alk. phosphatase Latest Ref Range: 39 - 117 IU/L 108   Triglyceride Latest Ref Range: 0 - 149 mg/dL 244 (H)   Cholesterol, total Latest Ref Range: 100 - 199 mg/dL 170   HDL Cholesterol Latest Ref Range: >39 mg/dL 33 (L)   LDL, calculated Latest Ref Range: 0 - 99 mg/dL 88   VLDL, calculated Latest Ref Range: 5 - 40 mg/dL 49 (H)   Hemoglobin A1c, (calculated) Latest Ref Range: 4.8 - 5.6 % 5.7 (H)   Estimated average glucose Latest Units: mg/dL 117   TSH Latest Ref Range: 0.450 - 4.500 uIU/mL 1.660       VITAMIN D, 25-HYDROXY 15.3 (L) 30.0 - 100.0 ng/mL Final     Visit Vitals    /84 (BP 1 Location: Left arm, BP Patient Position: Sitting)    Pulse 78    Temp 98.7 °F (37.1 °C) (Oral)    Resp 16    Ht 6' 1\" (1.854 m)    Wt 236 lb (107 kg)    SpO2 98%    BMI 31.14 kg/m2       ASSESSMENT and PLAN    ICD-10-CM ICD-9-CM    1. Routine general medical examination at a health care facility Z00.00 V70.0    2. Hypertriglyceridemia E78.1 272.1    3. Prediabetes R73.03 790.29    4. Vitamin D deficiency E55.9 268.9 ergocalciferol (ERGOCALCIFEROL) 50,000 unit capsule   5.  Obesity (BMI 30.0-34. 9) E66.9 278.00    Avoid dietary starch and sugar and follow a program of regular aerobic exercise. Begin ergocalciferol (Vitamin D2) 50,000 units weekly and OTC Vitamin D3 2,000 units daily. Return for follow up in 6 months, sooner with any problems.  HgbA1c, Vitamin D level, fasting lipid panel at follow up

## 2018-04-13 ENCOUNTER — OFFICE VISIT (OUTPATIENT)
Dept: FAMILY MEDICINE CLINIC | Age: 28
End: 2018-04-13

## 2018-04-13 VITALS
SYSTOLIC BLOOD PRESSURE: 118 MMHG | HEIGHT: 73 IN | DIASTOLIC BLOOD PRESSURE: 76 MMHG | BODY MASS INDEX: 31.14 KG/M2 | TEMPERATURE: 98.4 F | HEART RATE: 84 BPM | WEIGHT: 235 LBS | RESPIRATION RATE: 20 BRPM | OXYGEN SATURATION: 98 %

## 2018-04-13 DIAGNOSIS — R59.0 POSTERIOR CERVICAL LYMPHADENOPATHY: Primary | ICD-10-CM

## 2018-04-13 DIAGNOSIS — J30.1 SEASONAL ALLERGIC RHINITIS DUE TO POLLEN: ICD-10-CM

## 2018-04-13 RX ORDER — AMOXICILLIN AND CLAVULANATE POTASSIUM 875; 125 MG/1; MG/1
TABLET, FILM COATED ORAL
Qty: 20 TAB | Refills: 0 | Status: SHIPPED | OUTPATIENT
Start: 2018-04-13

## 2018-04-13 NOTE — MR AVS SNAPSHOT
[address] [place] [address] [address] [facility] [address] [phone] Patient: Hermilo Gallo MRN: [id number] YNF:[id number] Visit Information Date & Time Provider Department Dept. Phone Encounter #  
 4/13/2018 11:30 AM Bethel Suresh, 3 [facility] [phone] [id number] Upcoming Health Maintenance Date Due Influenza Age 5 to Adult 8/1/2017 DTaP/Tdap/Td series (7 - Td) 7/24/2019 Allergies as of 4/13/2018  Review Complete On: 4/13/2018 By: Bethel Suresh MD  
 No Known Allergies Current Immunizations  Never Reviewed Name Date DTAP Vaccine 1/19/1996, 2/16/1993, 1/22/1992, 1991, 1991 HIB Vaccine 10/14/1992, 1/22/1992, 1991, 1991 Hepatitis B Vaccine 3/5/2002, 9/27/2001, 8/22/2001 IPV 1/19/1996, 2/16/1993, 1991, 1991 MMR Vaccine 1/19/1996, 10/14/1992 Meningococcal Vaccine 8/17/2007 PPD 7/24/2009 TDAP Vaccine 7/24/2009 Varicella Virus Vaccine Live 6/30/1995 Not reviewed this visit You Were Diagnosed With   
  
 Codes Comments Posterior cervical lymphadenopathy    -  Primary ICD-10-CM: R59.0 ICD-9-CM: 785.6 Seasonal allergic rhinitis due to pollen     ICD-10-CM: J30.1 ICD-9-CM: 477.0 Vitals BP Pulse Temp Resp Height(growth percentile) Weight(growth percentile) 118/76 (BP 1 Location: Left arm, BP Patient Position: Sitting) 84 98.4 °F (36.9 °C) (Oral) 20 6' 1\" (1.854 m) 235 lb (106.6 kg) SpO2 BMI Smoking Status 98% 31 kg/m2 Never Smoker Vitals History BMI and BSA Data Body Mass Index Body Surface Area  
 31 kg/m 2 2.34 m 2 Preferred Pharmacy Pharmacy Name Phone [address] [facility] [phone] Your Updated Medication List  
  
   
This list is accurate as of 4/13/18 12:07 PM.  Always use your most recent med list.  
  
 amoxicillin-clavulanate 875-125 mg per tablet Commonly known as:  AUGMENTIN Take 1 tablet twice daily with food x 10 days Prescriptions Sent to Pharmacy Refills  
 amoxicillin-clavulanate (AUGMENTIN) 875-125 mg per tablet 0 Sig: Take 1 tablet twice daily with food x 10 days Class: Normal  
 Pharmacy: [id number] [place] [place]  #: [phone] To-Do List   
 04/13/2018 Lab:  CBC WITH AUTOMATED DIFF   
  
 04/13/2018 Lab:  MONONUCLEOSIS SCREEN Patient Instructions Complete prescribed course of antibiotics. Further disposition pending lab results if indicated. Try OTC cetrizine (Zyrtec) 10 mg daily and fluticasone (Flonase) nasal spray 2 sprays to each nostril every day for allergy symptoms Follow up for new symptoms, worsening symptoms or failure to improve. Seasonal Allergies: Care Instructions Your Care Instructions Allergies occur when your body's defense system (immune system) overreacts to certain substances. The immune system treats a harmless substance as if it were a harmful germ or virus. Many things can cause this to happen. Examples include pollens, medicine, food, dust, animal dander, and mold. Your allergies are seasonal if you have symptoms just at certain times of the year. In that case, you are probably allergic to pollens from certain trees, grasses, or weeds. Allergies can be mild or severe. Over-the-counter allergy medicine may help with some symptoms. Read and follow all instructions on the label. Managing your allergies is an important part of staying healthy. Your doctor may suggest that you have tests to help find the cause of your allergies. When you know what things trigger your symptoms, you can avoid them. This can prevent allergy symptoms and other health problems. In some cases, immunotherapy might help.  For this treatment, you get shots or use pills that have a small amount of certain allergens in them. Your body \"gets used to\" the allergen, so you react less to it over time. This kind of treatment may help prevent or reduce some allergy symptoms. Follow-up care is a key part of your treatment and safety. Be sure to make and go to all appointments, and call your doctor if you are having problems. It's also a good idea to know your test results and keep a list of the medicines you take. How can you care for yourself at home? · Be safe with medicines. Take your medicines exactly as prescribed. Call your doctor if you think you are having a problem with your medicine. · During your allergy season, keep windows closed. If you need to use air-conditioning, change or clean all filters every month. Take a shower and change your clothes after you have been outside. · Stay inside when pollen counts are high. Vacuum once or twice a week. Use a vacuum  with a HEPA filter or a double-thickness filter. When should you call for help? Give an epinephrine shot if: 
? · You think you are having a severe allergic reaction. ? After giving an epinephrine shot, call 911, even if you feel better. ?Call 911 if: 
? · You have symptoms of a severe allergic reaction. These may include: 
¨ Sudden raised, red areas (hives) all over your body. ¨ Swelling of the throat, mouth, lips, or tongue. ¨ Trouble breathing. ¨ Passing out (losing consciousness). Or you may feel very lightheaded or suddenly feel weak, confused, or restless. ? · You have been given an epinephrine shot, even if you feel better. ?Call your doctor now or seek immediate medical care if: 
? · You have symptoms of an allergic reaction, such as: ¨ A rash or hives (raised, red areas on the skin). ¨ Itching. ¨ Swelling. ¨ Belly pain, nausea, or vomiting. ? Watch closely for changes in your health, and be sure to contact your doctor if: 
? · You do not get better as expected. Where can you learn more? Go to http://raj-yan.info/. Enter J912 in the search box to learn more about \"Seasonal Allergies: Care Instructions. \" Current as of: September 29, 2016 Content Version: 11.4 © 2214-0115 [employer]. Care instructions adapted under license by [employer] (which disclaims liability or warranty for this information). If you have questions about a medical condition or this instruction, always ask your healthcare professional. [place] [address] any warranty or liability for your use of this information. Using a Nasal Steroid Spray: Care Instructions Your Care Instructions Your doctor may suggest using a corticosteroid nasal spray for your allergy symptoms or sinus problems. These sprays reduce the swelling inside the nose and sinuses. Unlike decongestant nasal sprays, steroid sprays won't lead to more swelling when you stop taking them. These sprays start working in a few days, but it may take several weeks before you get the full effect. Most side effects are minor. The most common complaint is a burning feeling in the nose right after the spray is used. Some people get nosebleeds. Follow-up care is a key part of your treatment and safety. Be sure to make and go to all appointments, and call your doctor if you are having problems. It's also a good idea to know your test results and keep a list of the medicines you take. How can you care for yourself at home? Here are some tips for using these sprays: 
· You may need to prime the sprayer before you use it. This means spraying it into the air a few times to make sure you get the right amount of medicine. Follow the directions on the label. · Blow your nose before you spray. This will help clear out your nostrils. · Gently sniff the medicine into your nose as you spray.  Don't snort, or the medicine will go all the way into your throat where it won't do much good. 
· Aim the nozzle straight toward the outer wall of your nostril. This will help keep the medicine from irritating the inner walls of your nose, especially your septum (the wall that separates your left and right nostrils). · Don't blow your nose for 10 minutes or so after you spray. And try not to sneeze. · Be safe with medicines. Use this medicine exactly as prescribed. Call your doctor if you think you are having a problem with your medicine. · Clean your sprayer once a week. Read the label to learn how. When should you call for help? Watch closely for changes in your health, and be sure to contact your doctor if you have any problems. Where can you learn more? Go to http://raj-yan.info/. Enter B345 in the search box to learn more about \"Using a Nasal Steroid Spray: Care Instructions. \" Current as of: May 12, 2017 Content Version: 11.4 © 1816-6573 [employer]. Care instructions adapted under license by [employer] (which disclaims liability or warranty for this information). If you have questions about a medical condition or this instruction, always ask your healthcare professional. [place] [address] any warranty or liability for your use of this information. Introducing [facility]! [employer] introduces [employer] patient portal. Now you can access parts of your medical record, email your doctor's office, and request medication refills online. 1. In your internet browser, go to [email or web address] 2. Click on the First Time User? Click Here link in the Sign In box. You will see the New Member Sign Up page. 3. Enter your [employer] Access Code exactly as it appears below. You will not need to use this code after youve completed the sign-up process. If you do not sign up before the expiration date, you must request a new code. · [employer] Access Code: [email or web address] Expires: 7/12/2018 12:02 PM 
 4. Enter the last four digits of your Social Security Number (xxxx) and Date of Birth (mm/dd/yyyy) as indicated and click Submit. You will be taken to the next sign-up page. 5. Create a Cylande ID. This will be your Cylande login ID and cannot be changed, so think of one that is secure and easy to remember. 6. Create a Cylande password. You can change your password at any time. 7. Enter your Password Reset Question and Answer. This can be used at a later time if you forget your password. 8. Enter your e-mail address. You will receive e-mail notification when new information is available in [address] 9. Click Sign Up. You can now view and download portions of your medical record. 10. Click the Download Summary menu link to download a portable copy of your medical information. If you have questions, please visit the Frequently Asked Questions section of the Cylande website. Remember, Cylande is NOT to be used for urgent needs. For medical emergencies, dial 911. Now available from your iPhone and Android! Please provide this summary of care documentation to your next provider. Your primary care clinician is listed as Soraya Stone. If you have any questions after today's visit, please call [phone].

## 2018-04-13 NOTE — PATIENT INSTRUCTIONS
Complete prescribed course of antibiotics. Further disposition pending lab results if indicated. Try OTC cetrizine (Zyrtec) 10 mg daily and fluticasone (Flonase) nasal spray 2 sprays to each nostril every day for allergy symptoms  Follow up for new symptoms, worsening symptoms or failure to improve. Seasonal Allergies: Care Instructions  Your Care Instructions  Allergies occur when your body's defense system (immune system) overreacts to certain substances. The immune system treats a harmless substance as if it were a harmful germ or virus. Many things can cause this to happen. Examples include pollens, medicine, food, dust, animal dander, and mold. Your allergies are seasonal if you have symptoms just at certain times of the year. In that case, you are probably allergic to pollens from certain trees, grasses, or weeds. Allergies can be mild or severe. Over-the-counter allergy medicine may help with some symptoms. Read and follow all instructions on the label. Managing your allergies is an important part of staying healthy. Your doctor may suggest that you have tests to help find the cause of your allergies. When you know what things trigger your symptoms, you can avoid them. This can prevent allergy symptoms and other health problems. In some cases, immunotherapy might help. For this treatment, you get shots or use pills that have a small amount of certain allergens in them. Your body \"gets used to\" the allergen, so you react less to it over time. This kind of treatment may help prevent or reduce some allergy symptoms. Follow-up care is a key part of your treatment and safety. Be sure to make and go to all appointments, and call your doctor if you are having problems. It's also a good idea to know your test results and keep a list of the medicines you take. How can you care for yourself at home? · Be safe with medicines. Take your medicines exactly as prescribed.  Call your doctor if you think you are having a problem with your medicine. · During your allergy season, keep windows closed. If you need to use air-conditioning, change or clean all filters every month. Take a shower and change your clothes after you have been outside. · Stay inside when pollen counts are high. Vacuum once or twice a week. Use a vacuum  with a HEPA filter or a double-thickness filter. When should you call for help? Give an epinephrine shot if:  ? · You think you are having a severe allergic reaction. ? After giving an epinephrine shot, call 911, even if you feel better. ?Call 911 if:  ? · You have symptoms of a severe allergic reaction. These may include:  ¨ Sudden raised, red areas (hives) all over your body. ¨ Swelling of the throat, mouth, lips, or tongue. ¨ Trouble breathing. ¨ Passing out (losing consciousness). Or you may feel very lightheaded or suddenly feel weak, confused, or restless. ? · You have been given an epinephrine shot, even if you feel better. ?Call your doctor now or seek immediate medical care if:  ? · You have symptoms of an allergic reaction, such as:  ¨ A rash or hives (raised, red areas on the skin). ¨ Itching. ¨ Swelling. ¨ Belly pain, nausea, or vomiting. ? Watch closely for changes in your health, and be sure to contact your doctor if:  ? · You do not get better as expected. Where can you learn more? Go to http://raj-yan.info/. Enter J912 in the search box to learn more about \"Seasonal Allergies: Care Instructions. \"  Current as of: September 29, 2016  Content Version: 11.4  © 9649-8068 [employer]. Care instructions adapted under license by [employer] (which disclaims liability or warranty for this information). If you have questions about a medical condition or this instruction, always ask your healthcare professional. [address] any warranty or liability for your use of this information.        Using a Nasal Steroid Spray: Care Instructions  Your Care Instructions    Your doctor may suggest using a corticosteroid nasal spray for your allergy symptoms or sinus problems. These sprays reduce the swelling inside the nose and sinuses. Unlike decongestant nasal sprays, steroid sprays won't lead to more swelling when you stop taking them. These sprays start working in a few days, but it may take several weeks before you get the full effect. Most side effects are minor. The most common complaint is a burning feeling in the nose right after the spray is used. Some people get nosebleeds. Follow-up care is a key part of your treatment and safety. Be sure to make and go to all appointments, and call your doctor if you are having problems. It's also a good idea to know your test results and keep a list of the medicines you take. How can you care for yourself at home? Here are some tips for using these sprays:  · You may need to prime the sprayer before you use it. This means spraying it into the air a few times to make sure you get the right amount of medicine. Follow the directions on the label. · Blow your nose before you spray. This will help clear out your nostrils. · Gently sniff the medicine into your nose as you spray. Don't snort, or the medicine will go all the way into your throat where it won't do much good. · Aim the nozzle straight toward the outer wall of your nostril. This will help keep the medicine from irritating the inner walls of your nose, especially your septum (the wall that separates your left and right nostrils). · Don't blow your nose for 10 minutes or so after you spray. And try not to sneeze. · Be safe with medicines. Use this medicine exactly as prescribed. Call your doctor if you think you are having a problem with your medicine. · Clean your sprayer once a week. Read the label to learn how. When should you call for help?   Watch closely for changes in your health, and be sure to contact your doctor if you have any problems. Where can you learn more? Go to http://raj-yan.info/. Enter D347 in the search box to learn more about \"Using a Nasal Steroid Spray: Care Instructions. \"  Current as of: May 12, 2017  Content Version: 11.4  © 8787-8321 [employer]. Care instructions adapted under license by [employer] (which disclaims liability or warranty for this information). If you have questions about a medical condition or this instruction, always ask your healthcare professional. [address] any warranty or liability for your use of this information.

## 2018-04-13 NOTE — PROGRESS NOTES
Yesika Yusuf is a 33 y.o. male here for allergies         Yesika Yusuf is a 33 y.o. male (: 1990) presenting to address:    Chief Complaint   Patient presents with    Allergies     pt states he's has allergies symptoms for 2-3 weeks     Neck Pain     pt states for 2-3 weeks  he's had 2 lumps in the back of his neck        Vitals:    18 1133   BP: 118/76   Pulse: 84   Resp: 20   SpO2: 98%   Weight: 235 lb (106.6 kg)   Height: 6' 1\" (1.854 m)   PainSc:   0 - No pain       Hearing/Vision:   No exam data present    Learning Assessment:   No flowsheet data found. Depression Screening:     PHQ over the last two weeks 2018   Little interest or pleasure in doing things Not at all   Feeling down, depressed or hopeless Not at all   Total Score PHQ 2 0     Fall Risk Assessment:   No flowsheet data found. Abuse Screening:   No flowsheet data found. Coordination of Care Questionaire:   1. Have you been to the ER, urgent care clinic since your last visit? Hospitalized since your last visit? NO    2. Have you seen or consulted any other health care providers outside of the [facility] since your last visit? Include any pap smears or colon screening. NO    Advanced Directive:   1. Do you have an Advanced Directive? NO    2. Would you like information on Advanced Directives?  NO

## 2018-04-13 NOTE — PROGRESS NOTES
HISTORY OF PRESENT ILLNESS  Ang Taylor is a 33 y.o. male. Allergies   The history is provided by the patient and medical records. This is a recurrent problem. Episode onset: 2-3 weeks ago. Pertinent negatives include no abdominal pain, no headaches and no shortness of breath. Review of Systems   Constitutional: Negative for chills, fever and weight loss. HENT: Positive for congestion. Negative for sore throat. Rhinorrhea, sneezing    Has noted \"lumps\", right posterior neck x 2-3 weeks   Respiratory: Negative for cough and shortness of breath. Gastrointestinal: Negative for abdominal pain, nausea and vomiting. Neurological: Negative for headaches. Visit Vitals    /76 (BP 1 Location: Left arm, BP Patient Position: Sitting)    Pulse 84    Temp 98.4 °F (36.9 °C) (Oral)    Resp 20    Ht 6' 1\" (1.854 m)    Wt 235 lb (106.6 kg)    SpO2 98%    BMI 31 kg/m2     Physical Exam   Constitutional: He is oriented to person, place, and time. He appears well-developed and well-nourished. HENT:   Head: Normocephalic. Right Ear: Tympanic membrane and ear canal normal.   Left Ear: Tympanic membrane and ear canal normal.   Mouth/Throat: Oropharynx is clear and moist.   Eyes: Conjunctivae and EOM are normal.   Neck: Normal range of motion. Neck supple. Cardiovascular: Normal rate, regular rhythm and normal heart sounds. Pulmonary/Chest: Effort normal and breath sounds normal.   No supraclavicular axillary or inguinal adenopathy noted   Abdominal: Soft. There is no tenderness. Lymphadenopathy:     He has cervical adenopathy (smooth. discrete, mobile, nontender enlarged right posterior cervical lymph nodes). Neurological: He is alert and oriented to person, place, and time. Skin: Skin is warm and dry. Psychiatric: He has a normal mood and affect. His behavior is normal.   Nursing note and vitals reviewed. ASSESSMENT and PLAN    ICD-10-CM ICD-9-CM    1.  Posterior cervical lymphadenopathy R59.0 785.6 amoxicillin-clavulanate (AUGMENTIN) 875-125 mg per tablet      CBC WITH AUTOMATED DIFF      MONONUCLEOSIS SCREEN   2. Seasonal allergic rhinitis due to pollen J30.1 477.0    Complete prescribed course of antibiotics. Further disposition pending lab results if indicated. Try OTC cetrizine (Zyrtec) 10 mg daily and fluticasone (Flonase) nasal spray 2 sprays to each nostril every day for allergy symptoms  Follow up for new symptoms, worsening symptoms or failure to improve.

## 2022-03-18 PROBLEM — E66.9 OBESITY (BMI 30.0-34.9): Status: ACTIVE | Noted: 2017-04-17

## 2022-03-18 PROBLEM — E55.9 VITAMIN D DEFICIENCY: Status: ACTIVE | Noted: 2017-04-17

## 2022-03-18 PROBLEM — E78.1 HYPERTRIGLYCERIDEMIA: Status: ACTIVE | Noted: 2017-04-17

## 2022-03-18 PROBLEM — E66.811 OBESITY (BMI 30.0-34.9): Status: ACTIVE | Noted: 2017-04-17

## 2022-03-18 PROBLEM — R73.03 PREDIABETES: Status: ACTIVE | Noted: 2017-04-17

## 2023-01-13 ENCOUNTER — APPOINTMENT (OUTPATIENT)
Dept: FAMILY MEDICINE CLINIC | Age: 33
End: 2023-01-13

## 2023-01-13 ENCOUNTER — OFFICE VISIT (OUTPATIENT)
Dept: FAMILY MEDICINE CLINIC | Age: 33
End: 2023-01-13
Payer: COMMERCIAL

## 2023-01-13 ENCOUNTER — HOSPITAL ENCOUNTER (OUTPATIENT)
Dept: LAB | Age: 33
End: 2023-01-13
Payer: COMMERCIAL

## 2023-01-13 VITALS
RESPIRATION RATE: 16 BRPM | BODY MASS INDEX: 33 KG/M2 | OXYGEN SATURATION: 97 % | TEMPERATURE: 97.3 F | DIASTOLIC BLOOD PRESSURE: 80 MMHG | HEART RATE: 79 BPM | HEIGHT: 73 IN | SYSTOLIC BLOOD PRESSURE: 120 MMHG | WEIGHT: 249 LBS

## 2023-01-13 DIAGNOSIS — E78.1 HYPERTRIGLYCERIDEMIA: ICD-10-CM

## 2023-01-13 DIAGNOSIS — Z11.3 SCREEN FOR STD (SEXUALLY TRANSMITTED DISEASE): ICD-10-CM

## 2023-01-13 DIAGNOSIS — E55.9 VITAMIN D DEFICIENCY: ICD-10-CM

## 2023-01-13 DIAGNOSIS — R73.03 PREDIABETES: ICD-10-CM

## 2023-01-13 DIAGNOSIS — Z00.00 ROUTINE GENERAL MEDICAL EXAMINATION AT A HEALTH CARE FACILITY: Primary | ICD-10-CM

## 2023-01-13 DIAGNOSIS — Z00.00 ROUTINE GENERAL MEDICAL EXAMINATION AT A HEALTH CARE FACILITY: ICD-10-CM

## 2023-01-13 LAB
25(OH)D3 SERPL-MCNC: 17.5 NG/ML (ref 30–100)
ALBUMIN SERPL-MCNC: 3.6 G/DL (ref 3.4–5)
ALBUMIN/GLOB SERPL: 0.9 (ref 0.8–1.7)
ALP SERPL-CCNC: 88 U/L (ref 45–117)
ALT SERPL-CCNC: 61 U/L (ref 16–61)
ANION GAP SERPL CALC-SCNC: 6 MMOL/L (ref 3–18)
APPEARANCE UR: CLEAR
AST SERPL-CCNC: 30 U/L (ref 10–38)
BASOPHILS # BLD: 0 K/UL (ref 0–0.1)
BASOPHILS NFR BLD: 1 % (ref 0–2)
BILIRUB SERPL-MCNC: 0.3 MG/DL (ref 0.2–1)
BILIRUB UR QL: NEGATIVE
BUN SERPL-MCNC: 17 MG/DL (ref 7–18)
BUN/CREAT SERPL: 14 (ref 12–20)
CALCIUM SERPL-MCNC: 9 MG/DL (ref 8.5–10.1)
CHLORIDE SERPL-SCNC: 103 MMOL/L (ref 100–111)
CHOLEST SERPL-MCNC: 136 MG/DL
CO2 SERPL-SCNC: 31 MMOL/L (ref 21–32)
COLOR UR: YELLOW
CREAT SERPL-MCNC: 1.18 MG/DL (ref 0.6–1.3)
DIFFERENTIAL METHOD BLD: NORMAL
EOSINOPHIL # BLD: 0.1 K/UL (ref 0–0.4)
EOSINOPHIL NFR BLD: 2 % (ref 0–5)
ERYTHROCYTE [DISTWIDTH] IN BLOOD BY AUTOMATED COUNT: 12.8 % (ref 11.6–14.5)
EST. AVERAGE GLUCOSE BLD GHB EST-MCNC: 103 MG/DL
GLOBULIN SER CALC-MCNC: 3.8 G/DL (ref 2–4)
GLUCOSE SERPL-MCNC: 124 MG/DL (ref 74–99)
GLUCOSE UR STRIP.AUTO-MCNC: NEGATIVE MG/DL
HBA1C MFR BLD: 5.2 % (ref 4.2–5.6)
HCT VFR BLD AUTO: 45.2 % (ref 36–48)
HDLC SERPL-MCNC: 35 MG/DL (ref 40–60)
HDLC SERPL: 3.9 (ref 0–5)
HGB BLD-MCNC: 14.5 G/DL (ref 13–16)
HGB UR QL STRIP: NEGATIVE
IMM GRANULOCYTES # BLD AUTO: 0 K/UL (ref 0–0.04)
IMM GRANULOCYTES NFR BLD AUTO: 0 % (ref 0–0.5)
KETONES UR QL STRIP.AUTO: NEGATIVE MG/DL
LDLC SERPL CALC-MCNC: 68.6 MG/DL (ref 0–100)
LEUKOCYTE ESTERASE UR QL STRIP.AUTO: NEGATIVE
LIPID PROFILE,FLP: ABNORMAL
LYMPHOCYTES # BLD: 2.6 K/UL (ref 0.9–3.6)
LYMPHOCYTES NFR BLD: 44 % (ref 21–52)
MCH RBC QN AUTO: 26.7 PG (ref 24–34)
MCHC RBC AUTO-ENTMCNC: 32.1 G/DL (ref 31–37)
MCV RBC AUTO: 83.2 FL (ref 78–100)
MONOCYTES # BLD: 0.5 K/UL (ref 0.05–1.2)
MONOCYTES NFR BLD: 8 % (ref 3–10)
NEUTS SEG # BLD: 2.7 K/UL (ref 1.8–8)
NEUTS SEG NFR BLD: 45 % (ref 40–73)
NITRITE UR QL STRIP.AUTO: NEGATIVE
NRBC # BLD: 0 K/UL (ref 0–0.01)
NRBC BLD-RTO: 0 PER 100 WBC
PH UR STRIP: 6.5 (ref 5–8)
PLATELET # BLD AUTO: 214 K/UL (ref 135–420)
PMV BLD AUTO: 11.2 FL (ref 9.2–11.8)
POTASSIUM SERPL-SCNC: 4.3 MMOL/L (ref 3.5–5.5)
PROT SERPL-MCNC: 7.4 G/DL (ref 6.4–8.2)
PROT UR STRIP-MCNC: NEGATIVE MG/DL
RBC # BLD AUTO: 5.43 M/UL (ref 4.35–5.65)
RPR SER QL: REACTIVE
SODIUM SERPL-SCNC: 140 MMOL/L (ref 136–145)
SP GR UR REFRACTOMETRY: 1.03 (ref 1–1.03)
TRIGL SERPL-MCNC: 162 MG/DL (ref ?–150)
TSH SERPL DL<=0.05 MIU/L-ACNC: 1.58 UIU/ML (ref 0.36–3.74)
UROBILINOGEN UR QL STRIP.AUTO: 1 EU/DL (ref 0.2–1)
VLDLC SERPL CALC-MCNC: 32.4 MG/DL
WBC # BLD AUTO: 5.9 K/UL (ref 4.6–13.2)

## 2023-01-13 PROCEDURE — 84443 ASSAY THYROID STIM HORMONE: CPT

## 2023-01-13 PROCEDURE — 87491 CHLMYD TRACH DNA AMP PROBE: CPT

## 2023-01-13 PROCEDURE — 80061 LIPID PANEL: CPT

## 2023-01-13 PROCEDURE — 83036 HEMOGLOBIN GLYCOSYLATED A1C: CPT

## 2023-01-13 PROCEDURE — 82306 VITAMIN D 25 HYDROXY: CPT

## 2023-01-13 PROCEDURE — 87389 HIV-1 AG W/HIV-1&-2 AB AG IA: CPT

## 2023-01-13 PROCEDURE — 81003 URINALYSIS AUTO W/O SCOPE: CPT

## 2023-01-13 PROCEDURE — 86803 HEPATITIS C AB TEST: CPT

## 2023-01-13 PROCEDURE — 99395 PREV VISIT EST AGE 18-39: CPT | Performed by: FAMILY MEDICINE

## 2023-01-13 PROCEDURE — 36415 COLL VENOUS BLD VENIPUNCTURE: CPT

## 2023-01-13 PROCEDURE — 86780 TREPONEMA PALLIDUM: CPT

## 2023-01-13 PROCEDURE — 86592 SYPHILIS TEST NON-TREP QUAL: CPT

## 2023-01-13 PROCEDURE — 80053 COMPREHEN METABOLIC PANEL: CPT

## 2023-01-13 PROCEDURE — 85025 COMPLETE CBC W/AUTO DIFF WBC: CPT

## 2023-01-13 NOTE — PROGRESS NOTES
Keven Astorga is a 33 y.o. male (: 1990) presenting to address:    Chief Complaint   Patient presents with    Complete Physical    Immunization/Injection     Declined flu shot . Other     Father recently diagnosed with Myeloma has concerns . Vitals:    23 1040   BP: 120/80   Pulse: 79   Resp: 16   Temp: 97.3 °F (36.3 °C)   TempSrc: Temporal   SpO2: 97%   Weight: 249 lb (112.9 kg)   Height: 6' 1\" (1.854 m)   PainSc:   0 - No pain       Hearing/Vision:   No results found. Learning Assessment:   No flowsheet data found. Depression Screening:     3 most recent PHQ Screens 2023   Little interest or pleasure in doing things Not at all   Feeling down, depressed, irritable, or hopeless Not at all   Total Score PHQ 2 0     Fall Risk Assessment:   No flowsheet data found. Abuse Screening:   No flowsheet data found. ADL Assessment:   No flowsheet data found. Coordination of Care Questionaire:   1. \"Have you been to the ER, urgent care clinic since your last visit? Hospitalized since your last visit? \" No    2. \"Have you seen or consulted any other health care providers outside of the [address] [place] [address] since your last visit? \" No     3. For patients aged 39-70: Has the patient had a colonoscopy? NA - based on age       Advanced Directive:   1. Do you have an Advanced Directive? NO    2. Would you like information on Advanced Directives?  NO

## 2023-01-13 NOTE — PROGRESS NOTES
HISTORY OF PRESENT ILLNESS  Ang Donaldson is a 33 y.o. male. He presents for health assessment, preventative care and follow-up with a history of prediabetes, hyperlipidemia and vitamin D deficiency. He notes that his father has been diagnosed with multiple myeloma and is experiencing complications. Mr#: [id number]      Past Medical History:   Diagnosis Date    Asthma     childhood    Psoriasis     Seborrhea        History reviewed. No pertinent surgical history. Family History   Problem Relation Age of Onset    Diabetes Father     Cancer Father         bone marrow cancer    Hypertension Mother        No Known Allergies    Social History     Tobacco Use   Smoking Status Never   Smokeless Tobacco Never       Social History     Substance and Sexual Activity   Alcohol Use No           Patient Active Problem List   Diagnosis Code    Allergic rhinitis J30.9    Seborrheic dermatitis L21.9    Hypertriglyceridemia E78.1    Prediabetes R73.03    Vitamin D deficiency E55.9    Obesity (BMI 30.0-34. 9) E66.9       No current outpatient medications on file. Review of Systems   Constitutional:  Negative for chills, fever and weight loss. HENT:  Negative for congestion, ear pain, hearing loss and sore throat. Eyes:  Negative for blurred vision and double vision. Respiratory:  Negative for cough, shortness of breath and wheezing. Cardiovascular:  Negative for chest pain, palpitations and leg swelling. Gastrointestinal:  Negative for abdominal pain, blood in stool, constipation, diarrhea, heartburn, melena, nausea and vomiting. Genitourinary:  Negative for dysuria and urgency. Musculoskeletal:  Negative for joint pain and myalgias. Skin:  Negative for itching and rash. Neurological:  Negative for dizziness, tingling, sensory change, focal weakness and headaches. Endo/Heme/Allergies:  Negative for environmental allergies. Psychiatric/Behavioral:  Negative for depression.  The patient is not nervous/anxious and does not have insomnia. Visit Vitals  /80   Pulse 79   Temp 97.3 °F (36.3 °C) (Temporal)   Resp 16   Ht 6' 1\" (1.854 m)   Wt 249 lb (112.9 kg)   SpO2 97%   BMI 32.85 kg/m²       Physical Exam  Vitals and nursing note reviewed. Constitutional:       General: He is not in acute distress. Appearance: Normal appearance. He is obese. He is not ill-appearing. HENT:      Head: Normocephalic. Right Ear: Tympanic membrane, ear canal and external ear normal.      Left Ear: Tympanic membrane, ear canal and external ear normal.      Mouth/Throat:      Mouth: Mucous membranes are moist.      Pharynx: Oropharynx is clear. Eyes:      Extraocular Movements: Extraocular movements intact. Conjunctiva/sclera: Conjunctivae normal.      Pupils: Pupils are equal, round, and reactive to light. Neck:      Vascular: No carotid bruit. Cardiovascular:      Rate and Rhythm: Normal rate and regular rhythm. Heart sounds: Normal heart sounds. Pulmonary:      Effort: Pulmonary effort is normal.      Breath sounds: Normal breath sounds. Abdominal:      Palpations: Abdomen is soft. Tenderness: There is no abdominal tenderness. Musculoskeletal:         General: No deformity. Cervical back: Neck supple. Right lower leg: No edema. Left lower leg: No edema. Skin:     General: Skin is warm and dry. Neurological:      General: No focal deficit present. Mental Status: He is alert and oriented to person, place, and time. Psychiatric:         Mood and Affect: Mood normal.         Behavior: Behavior normal.       ASSESSMENT and PLAN    ICD-10-CM ICD-9-CM    1.  Routine general medical examination at a health care facility  Z00.00 V70.0 CBC WITH AUTOMATED DIFF      HEMOGLOBIN A1C WITH EAG      URINALYSIS W/ RFLX MICROSCOPIC      TSH 3RD GENERATION      METABOLIC PANEL, COMPREHENSIVE      LIPID PANEL      HEPATITIS C AB      VITAMIN D, 25 HYDROXY      HIV 1/2 AG/AB, 4TH GENERATION,W RFLX CONFIRM      RPR      CHLAMYDIA/NEISSERIA AMPLIFICATION      2. Prediabetes  R73.03 790.29 HEMOGLOBIN A1C WITH EAG      3. Hypertriglyceridemia  E78.1 272.1 TSH 3RD GENERATION      LIPID PANEL      4. Vitamin D deficiency  E55.9 268.9 VITAMIN D, 25 HYDROXY      5. Screen for STD (sexually transmitted disease)  Z11.3 V74.5 HIV 1/2 AG/AB, 4TH GENERATION,W RFLX CONFIRM      RPR      CHLAMYDIA/NEISSERIA AMPLIFICATION      Assessment:  Status of prediabetes, hyperlipidemia and vitamin D deficiency to be determined pending lab results    Health maintenance recommendations:  COVID-19 immunization with bivalent vaccine  Influenza immunization declined  Hepatitis C screening    Plan:  Lab studies ordered, further disposition pending results if indicated  Avoid dietary starch and sugar and follow program of regular aerobic exercise  Return for annual physical exam and follow-up in 1 year, return sooner with any problems  Pino Perales MD  Please always arrive at least 15 minutes before your scheduled appointment time      PLEASE NOTE:   This document has been produced using voice recognition software. Unrecognized errors in transcription may be present.   Please always arrive at least 15 minutes before your scheduled appointment time

## 2023-01-13 NOTE — PATIENT INSTRUCTIONS
Assessment:  Status of prediabetes, hyperlipidemia and vitamin D deficiency to be determined pending lab results    Health maintenance recommendations:  COVID-19 immunization with bivalent vaccine  Influenza immunization declined  Hepatitis C screening    Plan:  Lab studies ordered, further disposition pending results if indicated  Avoid dietary starch and sugar and follow program of regular aerobic exercise  Return for annual physical exam and follow-up in 1 year, return sooner with any problems  Ron Farrell MD  Please always arrive at least 15 minutes before your scheduled appointment time

## 2023-01-16 LAB
C TRACH RRNA SPEC QL NAA+PROBE: NEGATIVE
HCV AB SER IA-ACNC: <0.02 INDEX
HCV AB SERPL QL IA: NEGATIVE
HCV COMMENT,HCGAC: NORMAL
HIV 1+2 AB+HIV1 P24 AG SERPL QL IA: NONREACTIVE
HIV12 RESULT COMMENT, HHIVC: NORMAL
N GONORRHOEA RRNA SPEC QL NAA+PROBE: NEGATIVE
SPECIMEN SOURCE: NORMAL

## 2023-01-17 LAB — T PALLIDUM AB SER QL IF: NON REACTIVE

## 2024-04-01 ENCOUNTER — OFFICE VISIT (OUTPATIENT)
Dept: FAMILY MEDICINE CLINIC | Facility: CLINIC | Age: 34
End: 2024-04-01

## 2024-04-01 VITALS
TEMPERATURE: 98.7 F | OXYGEN SATURATION: 96 % | HEART RATE: 91 BPM | HEIGHT: 73 IN | SYSTOLIC BLOOD PRESSURE: 130 MMHG | WEIGHT: 275 LBS | BODY MASS INDEX: 36.45 KG/M2 | RESPIRATION RATE: 16 BRPM | DIASTOLIC BLOOD PRESSURE: 76 MMHG

## 2024-04-01 DIAGNOSIS — J45.909 ASTHMA DUE TO SEASONAL ALLERGIES: Primary | ICD-10-CM

## 2024-04-01 PROCEDURE — 99213 OFFICE O/P EST LOW 20 MIN: CPT | Performed by: FAMILY MEDICINE

## 2024-04-01 RX ORDER — ALBUTEROL SULFATE 90 UG/1
2 AEROSOL, METERED RESPIRATORY (INHALATION) EVERY 4 HOURS PRN
Qty: 18 G | Refills: 5 | Status: SHIPPED | OUTPATIENT
Start: 2024-04-01

## 2024-04-01 RX ORDER — PREDNISONE 10 MG/1
TABLET ORAL
Qty: 46 TABLET | Refills: 0 | Status: SHIPPED | OUTPATIENT
Start: 2024-04-01

## 2024-04-01 SDOH — ECONOMIC STABILITY: FOOD INSECURITY: WITHIN THE PAST 12 MONTHS, THE FOOD YOU BOUGHT JUST DIDN'T LAST AND YOU DIDN'T HAVE MONEY TO GET MORE.: NEVER TRUE

## 2024-04-01 SDOH — ECONOMIC STABILITY: INCOME INSECURITY: HOW HARD IS IT FOR YOU TO PAY FOR THE VERY BASICS LIKE FOOD, HOUSING, MEDICAL CARE, AND HEATING?: NOT HARD AT ALL

## 2024-04-01 SDOH — ECONOMIC STABILITY: HOUSING INSECURITY
IN THE LAST 12 MONTHS, WAS THERE A TIME WHEN YOU DID NOT HAVE A STEADY PLACE TO SLEEP OR SLEPT IN A SHELTER (INCLUDING NOW)?: NO

## 2024-04-01 SDOH — ECONOMIC STABILITY: FOOD INSECURITY: WITHIN THE PAST 12 MONTHS, YOU WORRIED THAT YOUR FOOD WOULD RUN OUT BEFORE YOU GOT MONEY TO BUY MORE.: NEVER TRUE

## 2024-04-01 ASSESSMENT — ENCOUNTER SYMPTOMS
EYE DISCHARGE: 1
EYE ITCHING: 1
WHEEZING: 1

## 2024-04-01 ASSESSMENT — PATIENT HEALTH QUESTIONNAIRE - PHQ9
SUM OF ALL RESPONSES TO PHQ QUESTIONS 1-9: 0
SUM OF ALL RESPONSES TO PHQ QUESTIONS 1-9: 0
SUM OF ALL RESPONSES TO PHQ9 QUESTIONS 1 & 2: 0
SUM OF ALL RESPONSES TO PHQ QUESTIONS 1-9: 0
SUM OF ALL RESPONSES TO PHQ QUESTIONS 1-9: 0
2. FEELING DOWN, DEPRESSED OR HOPELESS: NOT AT ALL
1. LITTLE INTEREST OR PLEASURE IN DOING THINGS: NOT AT ALL

## 2024-04-01 NOTE — PROGRESS NOTES
Jacques Restrepo is a 33 y.o. male (: 1990) presenting to address:    Chief Complaint   Patient presents with    Shortness of Breath     Wheezing that started last night . Eye were itchy and burning for last two days . Runny nose .        Vitals:    24 1401   BP: 130/76   Pulse: 91   Resp: 16   Temp: 98.7 °F (37.1 °C)   SpO2: 96%       \"Have you been to the ER, urgent care clinic since your last visit?  Hospitalized since your last visit?\"    NO    “Have you seen or consulted any other health care providers outside of [facility] since your last visit?”    NO

## 2024-04-01 NOTE — PROGRESS NOTES
HISTORY OF PRESENT ILLNESS  Jacques Restrepo  is a 33 y.o. y.o. male    She reports wheezing starting last night with shortness of breath, also itchy watery eyes        Mr#: [id number]      Past Medical History:   Diagnosis Date    Asthma     childhood    Psoriasis     Seborrhea        No past surgical history on file.    Family History   Problem Relation Age of Onset    Hypertension Mother     Cancer Father         bone marrow cancer/multiple myeloma    Diabetes Father        Not on File    Social History     Tobacco Use   Smoking Status Never   Smokeless Tobacco Never       Social History     Substance and Sexual Activity   Alcohol Use No       Immunization History   Administered Date(s) Administered    DTaP vaccine [birth year], 01/22/1992, 02/16/1993, 01/19/1996    Hepatitis B vaccine 08/22/2001, 09/27/2001, 03/05/2002    Hib vaccine [birth year], 01/22/1992, 10/14/1992    MMR, PRIORIX, M-M-R II, (age 12m+), SC, 0.5mL 10/14/1992, 01/19/1996    Meningococcal ACWY Vaccine 08/17/2007    Poliovirus, IPOL, (age 6w+), SC/IM, 0.5mL 1990, 1990, 02/16/1993, 01/19/1996    TDaP, ADACEL (age 10y-64y), BOOSTRIX (age 10y+), IM, 0.5mL 07/24/2009    Tst, Unspecified Formulation 07/24/2009    Varicella, VARIVAX, (age 12m+), SC, 0.5mL 06/30/1995       Patient Active Problem List   Diagnosis    Hypertriglyceridemia    Vitamin D deficiency    Allergic rhinitis    Obesity (BMI 30.0-34.9)    Prediabetes    Seborrheic dermatitis         Current Outpatient Medications:     amoxicillin-clavulanate (AUGMENTIN) 875-125 MG per tablet, Take 1 tablet twice daily with food x 10 days, Disp: , Rfl:       Review of Systems   Constitutional:  Negative for fever.   HENT:  Positive for sneezing and sore throat.    Eyes:  Positive for discharge and itching.   Respiratory:  Positive for shortness of breath and wheezing.        /76   Pulse 91   Temp 98.7 °F (37.1 °C) (Temporal)   Resp 16   Ht 1.854 m (6'

## 2024-04-01 NOTE — PATIENT INSTRUCTIONS
Current Status:  Abrupt onset of wheezing last night associated with itchy watery eyes, sneezing-known history of seasonal allergy symptoms    Health Maintenance Recommendations:  Keep current with COVID-19 immunization guidelines  Tdap immunization    Plan:  Begin prednisone 10 mg, 6 tablets daily in the morning with food x 4 days, 4 tablets daily in the morning with food x 4 days, 2 tablets daily with food in the morning x 2 days, 1 with food tablet in the morning x 2 days  Albuterol inhaler 2 puffs up to every 4 hours as needed for wheezing, shortness of breath  OTC antihistamine such as Zyrtec or Xyzal daily through the allergy season, also consider OTC Flonase nasal spray 2 sprays to each side of the nose once daily to help with sneezing and eye symptoms  Please schedule a fasting lab appointment followed by an annual physical exam appointment  Please always arrive at least 15 minutes before your scheduled appointment time.

## 2024-04-06 DIAGNOSIS — E78.1 HYPERTRIGLYCERIDEMIA: ICD-10-CM

## 2024-04-06 DIAGNOSIS — Z00.00 ROUTINE HEALTH MAINTENANCE: Primary | ICD-10-CM

## 2024-04-06 DIAGNOSIS — E55.9 VITAMIN D DEFICIENCY: ICD-10-CM

## 2024-04-06 DIAGNOSIS — Z87.898 HISTORY OF PREDIABETES: ICD-10-CM

## 2024-04-10 LAB
A/G RATIO: 1.3 RATIO (ref 1.1–2.6)
ALBUMIN SERPL-MCNC: 4 G/DL (ref 3.5–5)
ALP BLD-CCNC: 87 U/L (ref 25–115)
ALT SERPL-CCNC: 64 U/L (ref 5–40)
ANION GAP SERPL CALCULATED.3IONS-SCNC: 11 MMOL/L (ref 3–15)
AST SERPL-CCNC: 26 U/L (ref 10–37)
BILIRUB SERPL-MCNC: 0.2 MG/DL (ref 0.2–1.2)
BILIRUB SERPL-MCNC: NEGATIVE MG/DL
BLOOD: NEGATIVE
BUN BLDV-MCNC: 18 MG/DL (ref 6–22)
CALCIUM SERPL-MCNC: 8.6 MG/DL (ref 8.4–10.5)
CHLORIDE BLD-SCNC: 106 MMOL/L (ref 98–110)
CHOLESTEROL/HDL RATIO: 3.3 (ref 0–5)
CHOLESTEROL: 183 MG/DL (ref 110–200)
CLARITY: CLEAR
CO2: 27 MMOL/L (ref 20–32)
COLOR: YELLOW
CREAT SERPL-MCNC: 1.1 MG/DL (ref 0.5–1.2)
ESTIMATED AVERAGE GLUCOSE: 111 MG/DL (ref 91–123)
GLOBULIN: 3 G/DL (ref 2–4)
GLOMERULAR FILTRATION RATE: >60 ML/MIN/1.73 SQ.M.
GLUCOSE: 78 MG/DL (ref 70–99)
GLUCOSE: NEGATIVE MG/DL
HBA1C MFR BLD: 5.5 % (ref 4.8–5.6)
HCT VFR BLD CALC: 43.7 % (ref 36.6–51.9)
HDLC SERPL-MCNC: 56 MG/DL
HEMOGLOBIN: 13.3 G/DL (ref 13.2–17.3)
KETONES, URINE: NEGATIVE MG/DL
LDL CHOLESTEROL CALCULATED: 108 MG/DL (ref 50–99)
LDL/HDL RATIO: 1.9
LEUKOCYTE ESTERASE, URINE: NEGATIVE
LYMPHOCYTES C MAN (DIFF): 57 % (ref 20–45)
LYMPHS ABS-DIF: 8.3 K/UL (ref 1–4.8)
MCH RBC QN AUTO: 27 PG (ref 26–34)
MCHC RBC AUTO-ENTMCNC: 30 G/DL (ref 31–36)
MCV RBC AUTO: 88 FL (ref 80–95)
MONOCYTES ABS-DIF: 1.4 K/UL (ref 0.1–1)
MONOCYTES C MAN (DIFF): 10 % (ref 3–12)
NEUTROPHILS ABSOLUTE: 4.8 K/UL (ref 1.8–7.7)
NEUTROPHILS C MAN (DIFF): 33 % (ref 40–75)
NITRITE, URINE: NEGATIVE
NON-HDL CHOLESTEROL: 127 MG/DL
NORMOCHROMIC CELLAVISION: ABNORMAL
NORMOCYTIC CELLAVISION: ABNORMAL
PDW BLD-RTO: 15 % (ref 10–15.5)
PH, URINE: 6 PH (ref 5–8)
PLATELET # BLD: 247 K/UL (ref 140–440)
PMV BLD AUTO: 11.3 FL (ref 9–13)
POTASSIUM SERPL-SCNC: 4.4 MMOL/L (ref 3.5–5.5)
PROTEIN UA: ABNORMAL MG/DL
RBC: 4.95 M/UL (ref 3.8–5.8)
SMEAR REVIEW: ABNORMAL
SODIUM BLD-SCNC: 144 MMOL/L (ref 133–145)
SPECIFIC GRAVITY: 1.03 (ref 1–1.03)
TOTAL PROTEIN: 7 G/DL (ref 6.4–8.3)
TRIGL SERPL-MCNC: 93 MG/DL (ref 40–149)
TSH SERPL DL<=0.05 MIU/L-ACNC: 3.41 MCU/ML (ref 0.27–4.2)
UROBILINOGEN: 0.2 MG/DL
VITAMIN D 25-HYDROXY: 14.9 NG/ML (ref 32–100)
VLDLC SERPL CALC-MCNC: 19 MG/DL (ref 8–30)
WBC: 14.4 K/UL (ref 4–11)

## 2024-04-11 LAB — PATH REVIEW OF SMEAR: NORMAL

## 2024-04-18 PROBLEM — Z80.7 FAMILY HISTORY OF MULTIPLE MYELOMA: Status: ACTIVE | Noted: 2024-04-18

## 2024-04-18 ASSESSMENT — ENCOUNTER SYMPTOMS
DIARRHEA: 0
BLOOD IN STOOL: 0
ANAL BLEEDING: 0
COUGH: 0
CHEST TIGHTNESS: 0
ABDOMINAL PAIN: 0
SHORTNESS OF BREATH: 0
SORE THROAT: 0
CONSTIPATION: 0
VOMITING: 0
NAUSEA: 0

## 2024-04-18 NOTE — PROGRESS NOTES
HISTORY OF PRESENT ILLNESS  Jacques Restrepo  is a 33 y.o. y.o. male    He presents for health assessment, preventative care and follow-up with a history of prediabetes, hyperlipidemia and vitamin D deficiency as well as severe obesity.  Pertinent family history includes his father diagnosed with multiple myeloma.  He was seen 3 weeks ago with an exacerbation of asthma associated with seasonal allergies.        Mr#: [id number]      Past Medical History:   Diagnosis Date    Asthma     childhood    Psoriasis     Seborrhea        No past surgical history on file.    Family History   Problem Relation Age of Onset    Hypertension Mother     Cancer Father         bone marrow cancer/multiple myeloma    Diabetes Father        No Known Allergies    Social History     Tobacco Use   Smoking Status Never   Smokeless Tobacco Never       Social History     Substance and Sexual Activity   Alcohol Use No       Immunization History   Administered Date(s) Administered    DTaP vaccine [birth year], 01/22/1992, 02/16/1993, 01/19/1996    Hepatitis B vaccine 08/22/2001, 09/27/2001, 03/05/2002    Hib vaccine [birth year], 01/22/1992, 10/14/1992    MMR, PRIORIX, M-M-R II, (age 12m+), SC, 0.5mL 10/14/1992, 01/19/1996    Meningococcal ACWY Vaccine 08/17/2007    Poliovirus, IPOL, (age 6w+), SC/IM, 0.5mL 1990, 1991, 02/16/1993, 01/19/1996    TDaP, ADACEL (age 10y-64y), BOOSTRIX (age 10y+), IM, 0.5mL 07/24/2009    Tst, Unspecified Formulation 07/24/2009    Varicella, VARIVAX, (age 12m+), SC, 0.5mL 06/30/1995       Patient Active Problem List   Diagnosis    Hypertriglyceridemia    Vitamin D deficiency    Allergic rhinitis    Obesity (BMI 30.0-34.9)    Prediabetes    Seborrheic dermatitis         Current Outpatient Medications:     Multiple Vitamins-Minerals (MULTI COMPLETE PO), Take by mouth, Disp: , Rfl:     albuterol sulfate HFA (VENTOLIN HFA) 108 (90 Base) MCG/ACT inhaler, Inhale 2 puffs into the lungs

## 2024-04-19 ENCOUNTER — OFFICE VISIT (OUTPATIENT)
Dept: FAMILY MEDICINE CLINIC | Facility: CLINIC | Age: 34
End: 2024-04-19

## 2024-04-19 VITALS
RESPIRATION RATE: 14 BRPM | BODY MASS INDEX: 36.47 KG/M2 | OXYGEN SATURATION: 96 % | HEIGHT: 73 IN | TEMPERATURE: 98 F | DIASTOLIC BLOOD PRESSURE: 80 MMHG | WEIGHT: 275.2 LBS | SYSTOLIC BLOOD PRESSURE: 120 MMHG | HEART RATE: 84 BPM

## 2024-04-19 DIAGNOSIS — Z87.898 HISTORY OF PREDIABETES: ICD-10-CM

## 2024-04-19 DIAGNOSIS — E66.01 SEVERE OBESITY (BMI 35.0-39.9) WITH COMORBIDITY (HCC): ICD-10-CM

## 2024-04-19 DIAGNOSIS — Z80.7 FAMILY HISTORY OF MULTIPLE MYELOMA: ICD-10-CM

## 2024-04-19 DIAGNOSIS — Z00.00 ROUTINE GENERAL MEDICAL EXAMINATION AT A HEALTH CARE FACILITY: Primary | ICD-10-CM

## 2024-04-19 DIAGNOSIS — E78.1 HYPERTRIGLYCERIDEMIA: ICD-10-CM

## 2024-04-19 DIAGNOSIS — E55.9 VITAMIN D DEFICIENCY: ICD-10-CM

## 2024-04-19 PROCEDURE — 99395 PREV VISIT EST AGE 18-39: CPT | Performed by: FAMILY MEDICINE

## 2024-04-19 RX ORDER — ERGOCALCIFEROL 1.25 MG/1
CAPSULE ORAL
Qty: 6 CAPSULE | Refills: 0 | Status: SHIPPED | OUTPATIENT
Start: 2024-04-19

## 2024-04-19 ASSESSMENT — ENCOUNTER SYMPTOMS: WHEEZING: 1

## 2024-04-19 NOTE — PATIENT INSTRUCTIONS
Current Status:  Satisfactory lipid levels  Normal hemoglobin A1c at 5.5%  Markedly low vitamin D level  CBC with mild leukocytosis, manual differential without significant abnormality to explain by the patient having started a course of prednisone a few days prior to the blood draw  Obesity unchanged    Health Maintenance Recommendations:  Keep current with COVID-19 musician guidelines  Tdap immunization-current    Plan:  Continue current medications  Begin vitamin D 2 (ergocalciferol) 50,000 international units weekly x 6 weeks  Begin over-the-counter vitamin D3 (cholecalciferol) 5000 international units daily indefinitely  Please schedule a nonfasting lab appointment for repeat CBC and vitamin D3 level in 6 months  Avoid dietary starch and sugar and as much as possible follow program of regular aerobic exercise.  Please always arrive at least 15 minutes before your scheduled appointment time.

## 2024-04-19 NOTE — PROGRESS NOTES
Jacques Restrepo is a 33 y.o. male (: 1990) presenting to address:    Chief Complaint   Patient presents with    Annual Exam              Vitals:    24 1343   BP: 120/80   Pulse: 84   Resp: 14   Temp: 98 °F (36.7 °C)   SpO2: 96%       \"Have you been to the ER, urgent care clinic since your last visit?  Hospitalized since your last visit?\"    NO    “Have you seen or consulted any other health care providers outside of [facility] since your last visit?”    NO

## 2024-10-13 DIAGNOSIS — Z80.7 FAMILY HISTORY OF MULTIPLE MYELOMA: ICD-10-CM

## 2024-10-13 DIAGNOSIS — E55.9 VITAMIN D DEFICIENCY, UNSPECIFIED: ICD-10-CM

## 2024-10-13 DIAGNOSIS — D72.829 LEUKOCYTOSIS, UNSPECIFIED TYPE: Primary | ICD-10-CM
